# Patient Record
Sex: MALE | Race: BLACK OR AFRICAN AMERICAN | NOT HISPANIC OR LATINO
[De-identification: names, ages, dates, MRNs, and addresses within clinical notes are randomized per-mention and may not be internally consistent; named-entity substitution may affect disease eponyms.]

---

## 2017-03-24 ENCOUNTER — APPOINTMENT (OUTPATIENT)
Dept: PULMONOLOGY | Facility: CLINIC | Age: 67
End: 2017-03-24

## 2017-05-04 ENCOUNTER — APPOINTMENT (OUTPATIENT)
Dept: PULMONOLOGY | Facility: CLINIC | Age: 67
End: 2017-05-04

## 2017-05-04 VITALS
RESPIRATION RATE: 17 BRPM | DIASTOLIC BLOOD PRESSURE: 70 MMHG | OXYGEN SATURATION: 98 % | BODY MASS INDEX: 25.11 KG/M2 | HEIGHT: 67 IN | WEIGHT: 160 LBS | SYSTOLIC BLOOD PRESSURE: 120 MMHG | HEART RATE: 68 BPM | TEMPERATURE: 97.7 F

## 2017-05-04 DIAGNOSIS — R06.2 WHEEZING: ICD-10-CM

## 2017-05-04 DIAGNOSIS — G47.33 OBSTRUCTIVE SLEEP APNEA (ADULT) (PEDIATRIC): ICD-10-CM

## 2017-05-12 PROBLEM — G47.33 OBSTRUCTIVE SLEEP APNEA, ADULT: Status: ACTIVE | Noted: 2017-05-12

## 2017-06-23 ENCOUNTER — APPOINTMENT (OUTPATIENT)
Dept: SLEEP CENTER | Facility: CLINIC | Age: 67
End: 2017-06-23

## 2018-03-30 ENCOUNTER — EMERGENCY (EMERGENCY)
Facility: HOSPITAL | Age: 68
LOS: 1 days | Discharge: HOME | End: 2018-03-30
Attending: EMERGENCY MEDICINE
Payer: COMMERCIAL

## 2018-03-30 VITALS
TEMPERATURE: 99 F | DIASTOLIC BLOOD PRESSURE: 113 MMHG | RESPIRATION RATE: 24 BRPM | HEART RATE: 86 BPM | SYSTOLIC BLOOD PRESSURE: 181 MMHG | HEIGHT: 66 IN | OXYGEN SATURATION: 97 % | WEIGHT: 160.06 LBS

## 2018-03-30 VITALS
HEART RATE: 68 BPM | TEMPERATURE: 99 F | RESPIRATION RATE: 20 BRPM | DIASTOLIC BLOOD PRESSURE: 74 MMHG | SYSTOLIC BLOOD PRESSURE: 144 MMHG | OXYGEN SATURATION: 98 %

## 2018-03-30 LAB
ALBUMIN SERPL ELPH-MCNC: 4.3 G/DL — SIGNIFICANT CHANGE UP (ref 3.3–5)
ALP SERPL-CCNC: 57 U/L — SIGNIFICANT CHANGE UP (ref 40–120)
ALT FLD-CCNC: 20 U/L RC — SIGNIFICANT CHANGE UP (ref 10–45)
ANION GAP SERPL CALC-SCNC: 13 MMOL/L — SIGNIFICANT CHANGE UP (ref 5–17)
AST SERPL-CCNC: 22 U/L — SIGNIFICANT CHANGE UP (ref 10–40)
BASOPHILS # BLD AUTO: 0 K/UL — SIGNIFICANT CHANGE UP (ref 0–0.2)
BASOPHILS NFR BLD AUTO: 0.6 % — SIGNIFICANT CHANGE UP (ref 0–2)
BILIRUB SERPL-MCNC: 0.5 MG/DL — SIGNIFICANT CHANGE UP (ref 0.2–1.2)
BUN SERPL-MCNC: 10 MG/DL — SIGNIFICANT CHANGE UP (ref 7–23)
CALCIUM SERPL-MCNC: 9.4 MG/DL — SIGNIFICANT CHANGE UP (ref 8.4–10.5)
CHLORIDE SERPL-SCNC: 103 MMOL/L — SIGNIFICANT CHANGE UP (ref 96–108)
CO2 SERPL-SCNC: 22 MMOL/L — SIGNIFICANT CHANGE UP (ref 22–31)
CREAT SERPL-MCNC: 0.84 MG/DL — SIGNIFICANT CHANGE UP (ref 0.5–1.3)
EOSINOPHIL # BLD AUTO: 0.3 K/UL — SIGNIFICANT CHANGE UP (ref 0–0.5)
EOSINOPHIL NFR BLD AUTO: 4.8 % — SIGNIFICANT CHANGE UP (ref 0–6)
GLUCOSE SERPL-MCNC: 108 MG/DL — HIGH (ref 70–99)
HCT VFR BLD CALC: 45.4 % — SIGNIFICANT CHANGE UP (ref 39–50)
HGB BLD-MCNC: 15.5 G/DL — SIGNIFICANT CHANGE UP (ref 13–17)
LYMPHOCYTES # BLD AUTO: 1.1 K/UL — SIGNIFICANT CHANGE UP (ref 1–3.3)
LYMPHOCYTES # BLD AUTO: 15.4 % — SIGNIFICANT CHANGE UP (ref 13–44)
MCHC RBC-ENTMCNC: 29.9 PG — SIGNIFICANT CHANGE UP (ref 27–34)
MCHC RBC-ENTMCNC: 34.1 GM/DL — SIGNIFICANT CHANGE UP (ref 32–36)
MCV RBC AUTO: 87.5 FL — SIGNIFICANT CHANGE UP (ref 80–100)
MONOCYTES # BLD AUTO: 0.7 K/UL — SIGNIFICANT CHANGE UP (ref 0–0.9)
MONOCYTES NFR BLD AUTO: 10 % — SIGNIFICANT CHANGE UP (ref 2–14)
NEUTROPHILS # BLD AUTO: 4.9 K/UL — SIGNIFICANT CHANGE UP (ref 1.8–7.4)
NEUTROPHILS NFR BLD AUTO: 69.2 % — SIGNIFICANT CHANGE UP (ref 43–77)
PLATELET # BLD AUTO: 232 K/UL — SIGNIFICANT CHANGE UP (ref 150–400)
POTASSIUM SERPL-MCNC: 4 MMOL/L — SIGNIFICANT CHANGE UP (ref 3.5–5.3)
POTASSIUM SERPL-SCNC: 4 MMOL/L — SIGNIFICANT CHANGE UP (ref 3.5–5.3)
PROT SERPL-MCNC: 7.7 G/DL — SIGNIFICANT CHANGE UP (ref 6–8.3)
RBC # BLD: 5.19 M/UL — SIGNIFICANT CHANGE UP (ref 4.2–5.8)
RBC # FLD: 12.2 % — SIGNIFICANT CHANGE UP (ref 10.3–14.5)
SODIUM SERPL-SCNC: 138 MMOL/L — SIGNIFICANT CHANGE UP (ref 135–145)
WBC # BLD: 7 K/UL — SIGNIFICANT CHANGE UP (ref 3.8–10.5)
WBC # FLD AUTO: 7 K/UL — SIGNIFICANT CHANGE UP (ref 3.8–10.5)

## 2018-03-30 PROCEDURE — 93005 ELECTROCARDIOGRAM TRACING: CPT

## 2018-03-30 PROCEDURE — 71045 X-RAY EXAM CHEST 1 VIEW: CPT

## 2018-03-30 PROCEDURE — 96374 THER/PROPH/DIAG INJ IV PUSH: CPT

## 2018-03-30 PROCEDURE — 94640 AIRWAY INHALATION TREATMENT: CPT

## 2018-03-30 PROCEDURE — 85027 COMPLETE CBC AUTOMATED: CPT

## 2018-03-30 PROCEDURE — 99285 EMERGENCY DEPT VISIT HI MDM: CPT | Mod: 25

## 2018-03-30 PROCEDURE — 71045 X-RAY EXAM CHEST 1 VIEW: CPT | Mod: 26

## 2018-03-30 PROCEDURE — 99284 EMERGENCY DEPT VISIT MOD MDM: CPT | Mod: 25

## 2018-03-30 PROCEDURE — 80053 COMPREHEN METABOLIC PANEL: CPT

## 2018-03-30 PROCEDURE — 96375 TX/PRO/DX INJ NEW DRUG ADDON: CPT

## 2018-03-30 RX ORDER — ALBUTEROL 90 UG/1
1 AEROSOL, METERED ORAL ONCE
Qty: 0 | Refills: 0 | Status: DISCONTINUED | OUTPATIENT
Start: 2018-03-30 | End: 2018-04-03

## 2018-03-30 RX ORDER — SODIUM CHLORIDE 9 MG/ML
1000 INJECTION, SOLUTION INTRAVENOUS ONCE
Qty: 0 | Refills: 0 | Status: COMPLETED | OUTPATIENT
Start: 2018-03-30 | End: 2018-03-30

## 2018-03-30 RX ORDER — IPRATROPIUM/ALBUTEROL SULFATE 18-103MCG
3 AEROSOL WITH ADAPTER (GRAM) INHALATION ONCE
Qty: 0 | Refills: 0 | Status: COMPLETED | OUTPATIENT
Start: 2018-03-30 | End: 2018-03-30

## 2018-03-30 RX ORDER — ALBUTEROL 90 UG/1
2 AEROSOL, METERED ORAL
Qty: 1 | Refills: 0 | OUTPATIENT
Start: 2018-03-30 | End: 2018-04-13

## 2018-03-30 RX ORDER — IPRATROPIUM/ALBUTEROL SULFATE 18-103MCG
3 AEROSOL WITH ADAPTER (GRAM) INHALATION
Qty: 0 | Refills: 0 | Status: COMPLETED | OUTPATIENT
Start: 2018-03-30 | End: 2018-03-30

## 2018-03-30 RX ORDER — ACETAMINOPHEN 500 MG
2 TABLET ORAL
Qty: 18 | Refills: 0 | OUTPATIENT
Start: 2018-03-30 | End: 2018-04-01

## 2018-03-30 RX ADMIN — Medication 3 MILLILITER(S): at 08:18

## 2018-03-30 RX ADMIN — Medication 3 MILLILITER(S): at 06:49

## 2018-03-30 RX ADMIN — Medication 3 MILLILITER(S): at 06:50

## 2018-03-30 RX ADMIN — Medication 125 MILLIGRAM(S): at 06:48

## 2018-03-30 RX ADMIN — Medication 3 MILLILITER(S): at 07:16

## 2018-03-30 RX ADMIN — SODIUM CHLORIDE 2000 MILLILITER(S): 9 INJECTION, SOLUTION INTRAVENOUS at 08:18

## 2018-03-30 NOTE — ED PROVIDER NOTE - PROGRESS NOTE DETAILS
Davian Mills MD (resident): patient assessed after signout, receiving first nebulizer; diffuse expiratory wheezing bilaterally Davian Mills MD (resident): patient reassessed and feels better, appears improved, no respiratory distress. good air entry. wheezing has improved. pt given strict return precautions.

## 2018-03-30 NOTE — ED PROVIDER NOTE - CARE PLAN
Principal Discharge DX:	Acute bronchitis Principal Discharge DX:	Acute bronchitis  Assessment and plan of treatment:	1) Please follow-up with your Primary Medical Doctor in 3-5 days. If you need to find a new physician, please call [phone].  2) Return to the Emergency Department if you experiences: difficulty breathing, chest pain, wheezing, dizziness, difficulty walking, or symptoms that are new or recurrent.  3) If you have any questions or concerns, do not hesitate to contact us at [phone].  4) You were treated in the Emergency Department for wheezing and probable Bronchitis. Please take the Prednisone steroid as prescribed, once a day, for the next 4 days. Use the Albuterol inhaler every 6 hours, except when sleeping comfortably, for 2 days. Afterwards, use the Albuterol as a rescue inhaler, only as needed, for shortness of breath, coughing, or wheezing. If you are using your inhaler more frequently than every 4 hours, then you should return to the ED immediately. Otherwise, you should follow-up with your Primary Doctor or a Pulmonologist. Also take the Antibiotics.

## 2018-03-30 NOTE — ED ADULT NURSE NOTE - OBJECTIVE STATEMENT
68 yo M arrived ambulatory from triage C/o of SOB, fever. PMH Overactive bladder. Reports being current   smoker, ( marijuana) Reports subjective fever at home. Symptoms similar to past pneumonia diagnosis.  Denies  CP dizziness weakness. VS stable. o2 100 RA. IV line place labs drawn.

## 2018-03-30 NOTE — ED PROVIDER NOTE - OBJECTIVE STATEMENT
66 yo M pmhx of overactive bladder, bph, daily marijuana use presenting with several days of productive cough, sob that is progressively worsening, chills and tactile fever. Did not take meds at home. Prior epsides in past have been pna per pt.     +fever/chills, no change in vision, no throat pain, no chest pain, +sob, +cough, no abdominal pain, no nausea/vomiting,  no dysuria, urinary frequency, chronic unchanged, no new joint pain, no rashes, no focal numbness or weakness, no known mental health issues, no latex allergy

## 2018-03-30 NOTE — ED ADULT NURSE REASSESSMENT NOTE - NS ED NURSE REASSESS COMMENT FT1
patient is resting in the room waiting for dispo. patient states the nebulizer treatments have helped his sob. patient denies any chest pain, body aches. skin is warm and dry. vss/nad. will continue to monitor.
patient was educated on the use of a spacer with an inhaler. patient was able to demonstrate proper use of the spacer and inhaler. d/c instructions provided. patient had no further questions. cleared for d/c by md. states feeling much better after receiving the nebulizer treatments. denies any sob.

## 2018-03-30 NOTE — ED PROVIDER NOTE - PHYSICAL EXAMINATION
AAOX3, NAD, NCAT, EOMI, PERRL, MMM, Neck Supple, RRR, diffuse rhonchi, expiratory wheeze, bl. poor air movment. no accessory muscle use, speaks in full sentences. ABD S/NT/ND +BS, No CVAT, EXT warm, well perfused, No Edema, Distal Pulses Intact, No Rash,  MAEx4, Sensation to soft touch grossly intact, normal strength/tone.

## 2018-06-11 NOTE — ED ADULT NURSE NOTE - NEURO WDL
1:1  needed 4 point restraints today Alert and oriented to person, place and time, memory intact, behavior appropriate to situation, PERRL. 1:1

## 2018-12-03 ENCOUNTER — EMERGENCY (EMERGENCY)
Facility: HOSPITAL | Age: 68
LOS: 1 days | Discharge: HOME | End: 2018-12-03
Attending: EMERGENCY MEDICINE
Payer: COMMERCIAL

## 2018-12-03 VITALS
SYSTOLIC BLOOD PRESSURE: 147 MMHG | DIASTOLIC BLOOD PRESSURE: 98 MMHG | OXYGEN SATURATION: 98 % | RESPIRATION RATE: 16 BRPM | WEIGHT: 160.06 LBS | TEMPERATURE: 97 F | HEART RATE: 79 BPM

## 2018-12-03 VITALS
SYSTOLIC BLOOD PRESSURE: 135 MMHG | TEMPERATURE: 98 F | OXYGEN SATURATION: 98 % | DIASTOLIC BLOOD PRESSURE: 82 MMHG | HEART RATE: 80 BPM | RESPIRATION RATE: 17 BRPM

## 2018-12-03 LAB
ALBUMIN SERPL ELPH-MCNC: 4.7 G/DL — SIGNIFICANT CHANGE UP (ref 3.3–5)
ALP SERPL-CCNC: 67 U/L — SIGNIFICANT CHANGE UP (ref 40–120)
ALT FLD-CCNC: 18 U/L — SIGNIFICANT CHANGE UP (ref 10–45)
ANION GAP SERPL CALC-SCNC: 12 MMOL/L — SIGNIFICANT CHANGE UP (ref 5–17)
APTT BLD: 36.7 SEC — HIGH (ref 27.5–36.3)
AST SERPL-CCNC: 17 U/L — SIGNIFICANT CHANGE UP (ref 10–40)
BASOPHILS # BLD AUTO: 0 K/UL — SIGNIFICANT CHANGE UP (ref 0–0.2)
BASOPHILS NFR BLD AUTO: 0.2 % — SIGNIFICANT CHANGE UP (ref 0–2)
BILIRUB SERPL-MCNC: 0.4 MG/DL — SIGNIFICANT CHANGE UP (ref 0.2–1.2)
BUN SERPL-MCNC: 10 MG/DL — SIGNIFICANT CHANGE UP (ref 7–23)
CALCIUM SERPL-MCNC: 9.9 MG/DL — SIGNIFICANT CHANGE UP (ref 8.4–10.5)
CHLORIDE SERPL-SCNC: 103 MMOL/L — SIGNIFICANT CHANGE UP (ref 96–108)
CO2 SERPL-SCNC: 25 MMOL/L — SIGNIFICANT CHANGE UP (ref 22–31)
CREAT SERPL-MCNC: 0.86 MG/DL — SIGNIFICANT CHANGE UP (ref 0.5–1.3)
D DIMER BLD IA.RAPID-MCNC: <150 NG/ML DDU — SIGNIFICANT CHANGE UP
EOSINOPHIL # BLD AUTO: 0.1 K/UL — SIGNIFICANT CHANGE UP (ref 0–0.5)
EOSINOPHIL NFR BLD AUTO: 1.8 % — SIGNIFICANT CHANGE UP (ref 0–6)
GLUCOSE SERPL-MCNC: 101 MG/DL — HIGH (ref 70–99)
HCT VFR BLD CALC: 44.7 % — SIGNIFICANT CHANGE UP (ref 39–50)
HGB BLD-MCNC: 15.3 G/DL — SIGNIFICANT CHANGE UP (ref 13–17)
INR BLD: 1.11 RATIO — SIGNIFICANT CHANGE UP (ref 0.88–1.16)
LYMPHOCYTES # BLD AUTO: 1.2 K/UL — SIGNIFICANT CHANGE UP (ref 1–3.3)
LYMPHOCYTES # BLD AUTO: 18.8 % — SIGNIFICANT CHANGE UP (ref 13–44)
MCHC RBC-ENTMCNC: 28.8 PG — SIGNIFICANT CHANGE UP (ref 27–34)
MCHC RBC-ENTMCNC: 34.3 GM/DL — SIGNIFICANT CHANGE UP (ref 32–36)
MCV RBC AUTO: 84.2 FL — SIGNIFICANT CHANGE UP (ref 80–100)
MONOCYTES # BLD AUTO: 0.6 K/UL — SIGNIFICANT CHANGE UP (ref 0–0.9)
MONOCYTES NFR BLD AUTO: 8.6 % — SIGNIFICANT CHANGE UP (ref 2–14)
NEUTROPHILS # BLD AUTO: 4.7 K/UL — SIGNIFICANT CHANGE UP (ref 1.8–7.4)
NEUTROPHILS NFR BLD AUTO: 70.7 % — SIGNIFICANT CHANGE UP (ref 43–77)
PLATELET # BLD AUTO: 286 K/UL — SIGNIFICANT CHANGE UP (ref 150–400)
POTASSIUM SERPL-MCNC: 4.3 MMOL/L — SIGNIFICANT CHANGE UP (ref 3.5–5.3)
POTASSIUM SERPL-SCNC: 4.3 MMOL/L — SIGNIFICANT CHANGE UP (ref 3.5–5.3)
PROT SERPL-MCNC: 7.7 G/DL — SIGNIFICANT CHANGE UP (ref 6–8.3)
PROTHROM AB SERPL-ACNC: 12.7 SEC — SIGNIFICANT CHANGE UP (ref 10–12.9)
RBC # BLD: 5.32 M/UL — SIGNIFICANT CHANGE UP (ref 4.2–5.8)
RBC # FLD: 12.4 % — SIGNIFICANT CHANGE UP (ref 10.3–14.5)
SODIUM SERPL-SCNC: 140 MMOL/L — SIGNIFICANT CHANGE UP (ref 135–145)
WBC # BLD: 6.6 K/UL — SIGNIFICANT CHANGE UP (ref 3.8–10.5)
WBC # FLD AUTO: 6.6 K/UL — SIGNIFICANT CHANGE UP (ref 3.8–10.5)

## 2018-12-03 PROCEDURE — 93005 ELECTROCARDIOGRAM TRACING: CPT

## 2018-12-03 PROCEDURE — 99285 EMERGENCY DEPT VISIT HI MDM: CPT | Mod: 25

## 2018-12-03 PROCEDURE — 71046 X-RAY EXAM CHEST 2 VIEWS: CPT

## 2018-12-03 PROCEDURE — 80053 COMPREHEN METABOLIC PANEL: CPT

## 2018-12-03 PROCEDURE — 99284 EMERGENCY DEPT VISIT MOD MDM: CPT | Mod: 25

## 2018-12-03 PROCEDURE — 85379 FIBRIN DEGRADATION QUANT: CPT

## 2018-12-03 PROCEDURE — 36415 COLL VENOUS BLD VENIPUNCTURE: CPT

## 2018-12-03 PROCEDURE — 85610 PROTHROMBIN TIME: CPT

## 2018-12-03 PROCEDURE — 93010 ELECTROCARDIOGRAM REPORT: CPT

## 2018-12-03 PROCEDURE — 85027 COMPLETE CBC AUTOMATED: CPT

## 2018-12-03 PROCEDURE — 71046 X-RAY EXAM CHEST 2 VIEWS: CPT | Mod: 26

## 2018-12-03 PROCEDURE — 85730 THROMBOPLASTIN TIME PARTIAL: CPT

## 2018-12-03 RX ORDER — IBUPROFEN 200 MG
600 TABLET ORAL ONCE
Qty: 0 | Refills: 0 | Status: COMPLETED | OUTPATIENT
Start: 2018-12-03 | End: 2018-12-03

## 2018-12-03 RX ORDER — AZITHROMYCIN 500 MG/1
1 TABLET, FILM COATED ORAL
Qty: 4 | Refills: 0 | OUTPATIENT
Start: 2018-12-03 | End: 2018-12-06

## 2018-12-03 RX ORDER — AZITHROMYCIN 500 MG/1
500 TABLET, FILM COATED ORAL ONCE
Qty: 0 | Refills: 0 | Status: COMPLETED | OUTPATIENT
Start: 2018-12-03 | End: 2018-12-03

## 2018-12-03 RX ADMIN — AZITHROMYCIN 500 MILLIGRAM(S): 500 TABLET, FILM COATED ORAL at 12:47

## 2018-12-03 RX ADMIN — Medication 40 MILLIGRAM(S): at 12:43

## 2018-12-03 RX ADMIN — Medication 600 MILLIGRAM(S): at 12:43

## 2018-12-03 NOTE — ED PROVIDER NOTE - OBJECTIVE STATEMENT
68 y M h/o HTN (?asthma?), h/o reactive airway dz/bronchitis treated in past c bronchodilators pw 1 month cough c greenish sputum intermittently, no f/c, now c approx 3 wk worsening L chest/upper quadrant pain provoked by ambulation and deep inspiration.  No f/c, n/v.  No LE swelling.  +plane flight 3 mon ago.  Was on augmentin x 10 days, finished 2 wk ago, no improvement of pain, told by primary medical doctor to come to ED today as patient not feeling better.  No acute changes.  No back pain.  No other complaints.  Not currently wheezing; only real complaint is L chest pain.

## 2018-12-03 NOTE — ED ADULT NURSE NOTE - OBJECTIVE STATEMENT
68y m pt with wife arrived in ed with c/o cough with greenish production; head pain x 1 month; pt went to pmd was given antibiotics for 10 days; pt complains symptoms not resolving; pt was on plane x 3 mos; pt smokes marijuana; aox3; no resp distress; + chest pain when coughing; no abd pain; no n/v/d; no fever/chills; pt states finished course of antibiotics 2 weeks ago; pt taking Symbicort; pt afebrile in ed; iv placed; labs drawn; pt o2 99%; pt resting comfortably; wife at bedside; safety/comfort maintained

## 2018-12-03 NOTE — ED PROVIDER NOTE - CARE PLAN
Principal Discharge DX:	Bronchitis  Assessment and plan of treatment:	1.  Stay Hydrated  2. Continue taking all current home medications  3.  Start taking Azithromycin 250mgs daily and Prednisone 40mgs daily x 4 days  4.  Take Tylenol 650mgs every 4-6 hrs and/or Ibuprofen 600mgs every 6 hrs as needed for pain   5.  Please follow up with your Primary care provider in 1-2 (Please bring all of your results with you)  6.  Return to the ER for worsening Chest Pain, Shortness of breath or any other concerning symptoms.

## 2018-12-03 NOTE — ED PROVIDER NOTE - PLAN OF CARE
1.  Stay Hydrated  2. Continue taking all current home medications  3.  Start taking Azithromycin 250mgs daily and Prednisone 40mgs daily x 4 days  4.  Take Tylenol 650mgs every 4-6 hrs and/or Ibuprofen 600mgs every 6 hrs as needed for pain   5.  Please follow up with your Primary care provider in 1-2 (Please bring all of your results with you)  6.  Return to the ER for worsening Chest Pain, Shortness of breath or any other concerning symptoms.

## 2018-12-03 NOTE — ED ADULT TRIAGE NOTE - CHIEF COMPLAINT QUOTE
Pt was dx with pneumonia 2 weeks ago on antibiotics went to follow with his MD last week and did not have a repeat x-ray and was told to come to the emergency room if not feeling better

## 2018-12-03 NOTE — ED PROVIDER NOTE - PROGRESS NOTE DETAILS
Patient feeling well.  Labs and CXR unremarkable.  Will treat with azithro, prednisone and close outpatient follow up.  -Eric Valencia PA-C

## 2018-12-03 NOTE — ED PROVIDER NOTE - PHYSICAL EXAMINATION
GENERAL: AAOx4, GCS 15, NAD, WDWN; HEENT: MMM, no jugular venous distension, supple neck, PERRLA, EOMI, nonicteric sclera; PULM: CTA B, no crackles/rubs/rales; CV: RRR, S1S2, no MRG; ABD: Flat abdomen, NTND, no R/G/R, no CVAT.  MSK: WISE, +2 pulses x4;  NEURO: No obvious focal deficits; PSYCH: AAOx3, clear thought and normal sensorium.

## 2018-12-03 NOTE — ED PROVIDER NOTE - MEDICAL DECISION MAKING DETAILS
Chronic cough, sob, pleuritic cp.  Not c/w acute coronary syndrome, seems more c/w bronchitis c reactive airway.  Not currently wheezing.  CP may represent costochondritis but given travel hx and pleuritic component will obtain dimer, cxr.  EKG.  Assuming normal w/u in ED will d/c c z-pack and medrol dosepack.  --BMM

## 2019-08-02 NOTE — ED PROVIDER NOTE - PLAN OF CARE
Constitutional : Appears in moderate to severe distress secondary to pain, unable to talk in full sentences secondary to nausea  Head :NC AT , no swelling  Eyes :eomi, no swelling  Mouth :mm dry  Neck : supple, trachea in midline  Chest :Ru air entry, symm chest expansion, no distress  Heart :S1 S2 distant  Abdomen :abd soft, non-distended, LUQ tenderness with localized guarding and rebound, decreased bowel sounds  Musc/Skel :ext no swelling, no deformity, no spine tenderness, distal pulses present  Neuro  :AAO 3 no focal deficits
1) Please follow-up with your Primary Medical Doctor in 3-5 days. If you need to find a new physician, please call [phone].  2) Return to the Emergency Department if you experiences: difficulty breathing, chest pain, wheezing, dizziness, difficulty walking, or symptoms that are new or recurrent.  3) If you have any questions or concerns, do not hesitate to contact us at [phone].  4) You were treated in the Emergency Department for wheezing and probable Bronchitis. Please take the Prednisone steroid as prescribed, once a day, for the next 4 days. Use the Albuterol inhaler every 6 hours, except when sleeping comfortably, for 2 days. Afterwards, use the Albuterol as a rescue inhaler, only as needed, for shortness of breath, coughing, or wheezing. If you are using your inhaler more frequently than every 4 hours, then you should return to the ED immediately. Otherwise, you should follow-up with your Primary Doctor or a Pulmonologist. Also take the Antibiotics.

## 2019-10-28 ENCOUNTER — EMERGENCY (EMERGENCY)
Facility: HOSPITAL | Age: 69
LOS: 1 days | Discharge: HOME | End: 2019-10-28
Attending: EMERGENCY MEDICINE
Payer: COMMERCIAL

## 2019-10-28 VITALS
SYSTOLIC BLOOD PRESSURE: 148 MMHG | DIASTOLIC BLOOD PRESSURE: 79 MMHG | RESPIRATION RATE: 16 BRPM | HEART RATE: 63 BPM | OXYGEN SATURATION: 96 % | TEMPERATURE: 98 F

## 2019-10-28 VITALS
HEART RATE: 75 BPM | TEMPERATURE: 98 F | RESPIRATION RATE: 18 BRPM | HEIGHT: 68 IN | DIASTOLIC BLOOD PRESSURE: 87 MMHG | WEIGHT: 158.07 LBS | OXYGEN SATURATION: 97 % | SYSTOLIC BLOOD PRESSURE: 144 MMHG

## 2019-10-28 PROBLEM — J18.9 PNEUMONIA, UNSPECIFIED ORGANISM: Chronic | Status: ACTIVE | Noted: 2018-12-03

## 2019-10-28 PROBLEM — I10 ESSENTIAL (PRIMARY) HYPERTENSION: Chronic | Status: ACTIVE | Noted: 2018-12-03

## 2019-10-28 PROBLEM — J45.909 UNSPECIFIED ASTHMA, UNCOMPLICATED: Chronic | Status: ACTIVE | Noted: 2018-12-03

## 2019-10-28 LAB
ALBUMIN SERPL ELPH-MCNC: 4 G/DL — SIGNIFICANT CHANGE UP (ref 3.3–5)
ALP SERPL-CCNC: 53 U/L — SIGNIFICANT CHANGE UP (ref 40–120)
ALT FLD-CCNC: 24 U/L — SIGNIFICANT CHANGE UP (ref 10–45)
ANION GAP SERPL CALC-SCNC: 10 MMOL/L — SIGNIFICANT CHANGE UP (ref 5–17)
APPEARANCE UR: CLEAR — SIGNIFICANT CHANGE UP
AST SERPL-CCNC: 23 U/L — SIGNIFICANT CHANGE UP (ref 10–40)
BACTERIA # UR AUTO: NEGATIVE — SIGNIFICANT CHANGE UP
BASOPHILS # BLD AUTO: 0.03 K/UL — SIGNIFICANT CHANGE UP (ref 0–0.2)
BASOPHILS NFR BLD AUTO: 0.6 % — SIGNIFICANT CHANGE UP (ref 0–2)
BILIRUB SERPL-MCNC: 0.2 MG/DL — SIGNIFICANT CHANGE UP (ref 0.2–1.2)
BILIRUB UR-MCNC: NEGATIVE — SIGNIFICANT CHANGE UP
BUN SERPL-MCNC: 11 MG/DL — SIGNIFICANT CHANGE UP (ref 7–23)
CALCIUM SERPL-MCNC: 9.2 MG/DL — SIGNIFICANT CHANGE UP (ref 8.4–10.5)
CHLORIDE SERPL-SCNC: 105 MMOL/L — SIGNIFICANT CHANGE UP (ref 96–108)
CO2 SERPL-SCNC: 23 MMOL/L — SIGNIFICANT CHANGE UP (ref 22–31)
COLOR SPEC: SIGNIFICANT CHANGE UP
CREAT SERPL-MCNC: 0.75 MG/DL — SIGNIFICANT CHANGE UP (ref 0.5–1.3)
DIFF PNL FLD: ABNORMAL
EOSINOPHIL # BLD AUTO: 0.33 K/UL — SIGNIFICANT CHANGE UP (ref 0–0.5)
EOSINOPHIL NFR BLD AUTO: 6.6 % — HIGH (ref 0–6)
EPI CELLS # UR: 2 /HPF — SIGNIFICANT CHANGE UP
GLUCOSE SERPL-MCNC: 108 MG/DL — HIGH (ref 70–99)
GLUCOSE UR QL: NEGATIVE — SIGNIFICANT CHANGE UP
HCT VFR BLD CALC: 42.6 % — SIGNIFICANT CHANGE UP (ref 39–50)
HGB BLD-MCNC: 13.7 G/DL — SIGNIFICANT CHANGE UP (ref 13–17)
HYALINE CASTS # UR AUTO: 0 /LPF — SIGNIFICANT CHANGE UP (ref 0–2)
IMM GRANULOCYTES NFR BLD AUTO: 0.2 % — SIGNIFICANT CHANGE UP (ref 0–1.5)
KETONES UR-MCNC: NEGATIVE — SIGNIFICANT CHANGE UP
LEUKOCYTE ESTERASE UR-ACNC: ABNORMAL
LIDOCAIN IGE QN: 40 U/L — SIGNIFICANT CHANGE UP (ref 7–60)
LYMPHOCYTES # BLD AUTO: 1.29 K/UL — SIGNIFICANT CHANGE UP (ref 1–3.3)
LYMPHOCYTES # BLD AUTO: 25.7 % — SIGNIFICANT CHANGE UP (ref 13–44)
MCHC RBC-ENTMCNC: 26.9 PG — LOW (ref 27–34)
MCHC RBC-ENTMCNC: 32.2 GM/DL — SIGNIFICANT CHANGE UP (ref 32–36)
MCV RBC AUTO: 83.7 FL — SIGNIFICANT CHANGE UP (ref 80–100)
MONOCYTES # BLD AUTO: 0.58 K/UL — SIGNIFICANT CHANGE UP (ref 0–0.9)
MONOCYTES NFR BLD AUTO: 11.6 % — SIGNIFICANT CHANGE UP (ref 2–14)
NEUTROPHILS # BLD AUTO: 2.77 K/UL — SIGNIFICANT CHANGE UP (ref 1.8–7.4)
NEUTROPHILS NFR BLD AUTO: 55.3 % — SIGNIFICANT CHANGE UP (ref 43–77)
NITRITE UR-MCNC: NEGATIVE — SIGNIFICANT CHANGE UP
NRBC # BLD: 0 /100 WBCS — SIGNIFICANT CHANGE UP (ref 0–0)
PH UR: 6 — SIGNIFICANT CHANGE UP (ref 5–8)
PLATELET # BLD AUTO: 249 K/UL — SIGNIFICANT CHANGE UP (ref 150–400)
POTASSIUM SERPL-MCNC: 3.9 MMOL/L — SIGNIFICANT CHANGE UP (ref 3.5–5.3)
POTASSIUM SERPL-SCNC: 3.9 MMOL/L — SIGNIFICANT CHANGE UP (ref 3.5–5.3)
PROT SERPL-MCNC: 6.7 G/DL — SIGNIFICANT CHANGE UP (ref 6–8.3)
PROT UR-MCNC: SIGNIFICANT CHANGE UP
RBC # BLD: 5.09 M/UL — SIGNIFICANT CHANGE UP (ref 4.2–5.8)
RBC # FLD: 13.2 % — SIGNIFICANT CHANGE UP (ref 10.3–14.5)
RBC CASTS # UR COMP ASSIST: 3 /HPF — SIGNIFICANT CHANGE UP (ref 0–4)
SODIUM SERPL-SCNC: 138 MMOL/L — SIGNIFICANT CHANGE UP (ref 135–145)
SP GR SPEC: 1.02 — SIGNIFICANT CHANGE UP (ref 1.01–1.02)
UROBILINOGEN FLD QL: NEGATIVE — SIGNIFICANT CHANGE UP
WBC # BLD: 5.01 K/UL — SIGNIFICANT CHANGE UP (ref 3.8–10.5)
WBC # FLD AUTO: 5.01 K/UL — SIGNIFICANT CHANGE UP (ref 3.8–10.5)
WBC UR QL: 6 /HPF — HIGH (ref 0–5)

## 2019-10-28 PROCEDURE — 87086 URINE CULTURE/COLONY COUNT: CPT

## 2019-10-28 PROCEDURE — 96374 THER/PROPH/DIAG INJ IV PUSH: CPT | Mod: XU

## 2019-10-28 PROCEDURE — 99284 EMERGENCY DEPT VISIT MOD MDM: CPT | Mod: 25

## 2019-10-28 PROCEDURE — 83690 ASSAY OF LIPASE: CPT

## 2019-10-28 PROCEDURE — 80053 COMPREHEN METABOLIC PANEL: CPT

## 2019-10-28 PROCEDURE — 74177 CT ABD & PELVIS W/CONTRAST: CPT | Mod: 26

## 2019-10-28 PROCEDURE — 74177 CT ABD & PELVIS W/CONTRAST: CPT

## 2019-10-28 PROCEDURE — 81001 URINALYSIS AUTO W/SCOPE: CPT

## 2019-10-28 PROCEDURE — 99284 EMERGENCY DEPT VISIT MOD MDM: CPT

## 2019-10-28 PROCEDURE — 85027 COMPLETE CBC AUTOMATED: CPT

## 2019-10-28 RX ORDER — KETOROLAC TROMETHAMINE 30 MG/ML
15 SYRINGE (ML) INJECTION ONCE
Refills: 0 | Status: DISCONTINUED | OUTPATIENT
Start: 2019-10-28 | End: 2019-10-28

## 2019-10-28 RX ORDER — SODIUM CHLORIDE 9 MG/ML
1000 INJECTION INTRAMUSCULAR; INTRAVENOUS; SUBCUTANEOUS ONCE
Refills: 0 | Status: COMPLETED | OUTPATIENT
Start: 2019-10-28 | End: 2019-10-28

## 2019-10-28 RX ADMIN — SODIUM CHLORIDE 1000 MILLILITER(S): 9 INJECTION INTRAMUSCULAR; INTRAVENOUS; SUBCUTANEOUS at 12:46

## 2019-10-28 RX ADMIN — Medication 15 MILLIGRAM(S): at 12:49

## 2019-10-28 NOTE — ED PROVIDER NOTE - NS ED ROS FT
Gen: Denies fever  CV: Denies chest pain  Skin: Denies rash, erythema, color changes  Resp: Denies SOB  GI: +abdominal pain denies nausea, vomiting, diarrhea   Msk: + back pain. neg leg swelling  : Denies dysuria, hematuria. +increased frequency

## 2019-10-28 NOTE — ED PROVIDER NOTE - DATE/TIME 1
"28-Oct-2019 16:15

## 2019-10-28 NOTE — ED ADULT NURSE NOTE - OBJECTIVE STATEMENT
Ambulatory pt c/o abdominal pain x yesterday.  Reports pain is constant, cramping in nature, 9/10, laying on his side makes it a little better, has not tried any meds for it, no n/v, no fevers, no diarrhea, +constipation, had bowel movement this morning but felt he had to strain, no bloody or dark stool.  No hx of abdominal surgeries.  Normal PO intake.  Pt is conversive, no apparent distress, abd soft/nd/ttp rlq and suprapubic, skin wdi, denies cp, sob, urinary symptoms, falls, traumas.  Requested urine sample. Ambulatory pt c/o abdominal pain x yesterday.  Reports pain is constant, cramping in nature, 9/10, laying on his side makes it a little better, has not tried any meds for it, no n/v, no fevers, no diarrhea, +constipation, had bowel movement this morning but felt he had to strain, no bloody or dark stool.  No hx of abdominal surgeries.  Normal PO intake.  Pt is conversive, no apparent distress, abd soft/nd/ttp rlq and suprapubic, skin wdi, denies cp, sob, falls, traumas.  He denies any hematuria, dysuria or malodorous urine but endorses that when he does need to urinate as he gets closer to the bathroom he is feeling the urge to go and having small amounts of incontinence, requested urine sample.

## 2019-10-28 NOTE — ED PROVIDER NOTE - PATIENT PORTAL LINK FT
You can access the FollowMyHealth Patient Portal offered by [facility] by registering at the following website: [email or web address]. By joining [employer]’s FollowMyHealth portal, you will also be able to view your health information using other applications (apps) compatible with our system.

## 2019-10-28 NOTE — ED PROVIDER NOTE - OBJECTIVE STATEMENT
68yo M Hx of BPH presents with complaints of lower abdominal cramping x 1 day. Pt reports that he woke up yesterday morning and with lower abd cramping that progressively worsened throughout the day and is now radiating around to his back. Denies N/V/D. Last BM this AM, notes he had to strain. Has had similar pain in the past and was given medication for it, doesn't know what was the cause or the medication. Reports urinary frequency x1 month, no hematuria or dysuria. Denies fevers, chills. no recent travel

## 2019-10-28 NOTE — ED PROVIDER NOTE - CLINICAL SUMMARY MEDICAL DECISION MAKING FREE TEXT BOX
68yo M Hx of BPH p/w 1 day lower abdominal pain, no N/V/D. 1 month urinary frequency, no dysuria or hematuria. possible intraabdominal pathology vs UTI. Will obtain labs, UA, CT. pain management and reassess. Nieves: p/w lower abdominal pain, crampy, intermittent since yesterday, no N/V/D. 1 month urinary frequency, no dysuria or hematuria. possible intraabdominal pathology vs UTI. Will obtain labs, UA, CT. pain management and reassess.

## 2019-10-28 NOTE — ED PROVIDER NOTE - PROGRESS NOTE DETAILS
Pt resting comfortably, reports some improvement in his pain. Discussed lab and CT results. will start pt on miralax and have him follow up with his urologist for urinary frequency. Pt is agreeable with this plan.

## 2019-10-28 NOTE — ED PROVIDER NOTE - PHYSICAL EXAMINATION
Gen: WDWN, NAD  HEENT: EOMI, no nasal discharge, mucous membranes moist  CV: RRR, +S1/S2, no M/R/G  Resp: CTAB, no W/R/R  GI: Abdomen soft non-distended, ttp LUQ and lower abd. no rebound or guarding.   MSK: No open wounds, no bruising, no LE edema  Neuro: A&Ox4, following commands, moving all four extremities spontaneously  Psych: appropriate mood

## 2019-10-28 NOTE — ED PROVIDER NOTE - NSFOLLOWUPINSTRUCTIONS_ED_ALL_ED_FT
you were seen in the emergency department for abdominal pain. You had a CT scan that showed no obstruction and a normal appendix.     Please start taking miralax tonight you were seen in the emergency department for abdominal pain. You had a CT scan that showed no obstruction and a normal appendix.     Please start taking miralax tonight.     Continue taking all home medications as prescribed.     Please make an appointment with your urologist to follow up within the next week.     please return to the emergency department if you develop new or worsening symptoms including: worsening abdominal pain, N/V/D, inability to pass gas or have a bowel movement, fevers, painful urination or blood in the urine.

## 2019-10-29 LAB
CULTURE RESULTS: SIGNIFICANT CHANGE UP
SPECIMEN SOURCE: SIGNIFICANT CHANGE UP

## 2019-10-30 NOTE — ED POST DISCHARGE NOTE - DETAILS
patient under care of urology for urinary frequency, on medication, no fever/chills or dysuria. recommended no tx but to follow up with urologist - Frances Cerda PA-C

## 2021-09-08 ENCOUNTER — APPOINTMENT (OUTPATIENT)
Dept: UROLOGY | Facility: CLINIC | Age: 71
End: 2021-09-08
Payer: COMMERCIAL

## 2021-09-08 VITALS
BODY MASS INDEX: 24.25 KG/M2 | WEIGHT: 160 LBS | TEMPERATURE: 98.2 F | HEIGHT: 68 IN | RESPIRATION RATE: 16 BRPM | DIASTOLIC BLOOD PRESSURE: 71 MMHG | SYSTOLIC BLOOD PRESSURE: 127 MMHG | HEART RATE: 68 BPM

## 2021-09-08 DIAGNOSIS — R10.9 UNSPECIFIED ABDOMINAL PAIN: ICD-10-CM

## 2021-09-08 DIAGNOSIS — N40.0 BENIGN PROSTATIC HYPERPLASIA WITHOUT LOWER URINARY TRACT SYMPMS: ICD-10-CM

## 2021-09-08 PROCEDURE — 99204 OFFICE O/P NEW MOD 45 MIN: CPT

## 2021-09-08 PROCEDURE — 51798 US URINE CAPACITY MEASURE: CPT

## 2021-09-08 NOTE — REVIEW OF SYSTEMS
[Wheezing] : wheezing [Abdominal Pain] : abdominal pain [Joint Pain] : joint pain [Strong urge to urinate] : strong urge to urinate [Negative] : ENT

## 2021-09-09 PROBLEM — R10.9 FLANK PAIN: Status: ACTIVE | Noted: 2021-09-09

## 2021-09-09 NOTE — HISTORY OF PRESENT ILLNESS
[FreeTextEntry1] : Reason for Visit: left sided flank pain \par \par This is a 70 year-year-old gentleman with . Patient is referred for evaluation of his condition. _____. Patient denies any gross hematuria or dysuria or urinary incontinence. The patient denies any aggravating or relieving factors. The patient denies any interference of function. The patient is entirely asymptomatic. All other review of systems are negative. Past medical history, family history and social history were inquired and were noncontributory to current condition. Patient [denies tobacco use]. Medications and allergies were reviewed.\par He has a history of kidney stones.  He also has a history of postvoid dribbling which is been increasingly worrisome to him.  There is no recent PSA value for us to evaluate.\par On examination, the patient is a healthy-appearing gentleman in no acute distress. He is alert and oriented follows commands. He has normal mood and affect. He is normocephalic. Oral no thrush. Neck is supple. Respirations are unlabored. His abdomen is soft and nontender. Liver is nonpalpable. Bladder is nonpalpable. No CVA tenderness. Neurologically he is grossly intact. No peripheral edema. Skin without gross abnormality. He has normal male external genitalia. Normal meatus. Bilateral testes are descended intrascrotally and normal to palpation. On rectal examination, there is normal sphincter tone. The prostate is clinically benign without focal induration or nodularity.\par PVR: 0\par \par Assessment: Persistent and worsening left-sided flank pain in a patient with a history of kidney stones no recent imaging studies.  We plan to do cystoscopy for the hematuria that we found today also CT scan will be ordered and a PSA blood test.\par \par I counseled the patient. I discussed the various etiologies of his symptoms. []. Risks and alternatives were discussed. I answered the patient questions. The patient will follow-up as directed and will contact me with any questions or concerns. Thank you for the opportunity to participate in the care of this patient. I'll keep you updated on his progress.\par \par Plan: _____. Followup in _____\par

## 2021-09-15 ENCOUNTER — APPOINTMENT (OUTPATIENT)
Dept: UROLOGY | Facility: CLINIC | Age: 71
End: 2021-09-15
Payer: COMMERCIAL

## 2021-09-15 VITALS
RESPIRATION RATE: 16 BRPM | SYSTOLIC BLOOD PRESSURE: 120 MMHG | DIASTOLIC BLOOD PRESSURE: 78 MMHG | HEART RATE: 68 BPM | TEMPERATURE: 97.5 F

## 2021-09-15 PROCEDURE — 99212 OFFICE O/P EST SF 10 MIN: CPT

## 2021-09-15 NOTE — HISTORY OF PRESENT ILLNESS
[FreeTextEntry1] : This is a patient with pain in his left back and side who presents for follow-up.  He had an MRI performed on his back which shows a significant number of the herniated disks.  Through L1-L5.  This is the most likely cause of the pain that he is describing.  His pain is not consistent with kidney pain\par \par Patient also had a normal PSA normal basic metabolic panel.  Normal liver function test.\par \par Impression pain is from lumbar disc disease.\par \par Plan we will refer him to a for evaluation of surgical versus conservative treatment of his back.  Patient is also complaining of reflux recommend Nexium or Prilosec over-the-counter for this and follow-up with his primary care doctor for further evaluation and treatment of his reflux.  Patient will follow up his primary care doctor for yearly PSA.

## 2021-09-23 ENCOUNTER — APPOINTMENT (OUTPATIENT)
Dept: SPINE | Facility: CLINIC | Age: 71
End: 2021-09-23
Payer: COMMERCIAL

## 2021-09-23 ENCOUNTER — NON-APPOINTMENT (OUTPATIENT)
Age: 71
End: 2021-09-23

## 2021-09-23 VITALS
DIASTOLIC BLOOD PRESSURE: 77 MMHG | BODY MASS INDEX: 24.55 KG/M2 | HEIGHT: 68 IN | WEIGHT: 162 LBS | HEART RATE: 59 BPM | SYSTOLIC BLOOD PRESSURE: 146 MMHG | OXYGEN SATURATION: 97 %

## 2021-09-23 DIAGNOSIS — Z72.89 OTHER PROBLEMS RELATED TO LIFESTYLE: ICD-10-CM

## 2021-09-23 DIAGNOSIS — M54.9 DORSALGIA, UNSPECIFIED: ICD-10-CM

## 2021-09-23 PROCEDURE — 99203 OFFICE O/P NEW LOW 30 MIN: CPT

## 2021-09-23 RX ORDER — PANTOPRAZOLE 40 MG/1
40 TABLET, DELAYED RELEASE ORAL
Refills: 0 | Status: ACTIVE | COMMUNITY

## 2021-09-23 NOTE — PHYSICAL EXAM
[Person] : oriented to person [Place] : oriented to place [Time] : oriented to time [Short Term Intact] : short term memory intact [Remote Intact] : remote memory intact [Span Intact] : the attention span was normal [Concentration Intact] : normal concentrating ability [Fluency] : fluency intact [Comprehension] : comprehension intact [Current Events] : adequate knowledge of current events [Past History] : adequate knowledge of personal past history [Vocabulary] : adequate range of vocabulary [Cranial Nerves Optic (II)] : visual acuity intact bilaterally,  pupils equal round and reactive to light [Cranial Nerves Oculomotor (III)] : extraocular motion intact [Cranial Nerves Trigeminal (V)] : facial sensation intact symmetrically [Cranial Nerves Facial (VII)] : face symmetrical [Cranial Nerves Vestibulocochlear (VIII)] : hearing was intact bilaterally [Cranial Nerves Glossopharyngeal (IX)] : tongue and palate midline [Cranial Nerves Accessory (XI - Cranial And Spinal)] : head turning and shoulder shrug symmetric [Cranial Nerves Hypoglossal (XII)] : there was no tongue deviation with protrusion [Motor Tone] : muscle tone was normal in all four extremities [Motor Strength] : muscle strength was normal in all four extremities [No Muscle Atrophy] : normal bulk in all four extremities [Sensation Tactile Decrease] : light touch was intact [Abnormal Walk] : normal gait [Balance] : balance was intact [2+] : Ankle jerk left 2+ [No Tenderness to Palpation] : no spine tenderness on palpation [Full ROM] : full ROM [No Pain with ROM] : no pain with motion in any direction [Able to toe walk] : the patient was able to toe walk [Able to heel walk] : the patient was able to heel walk [Past-pointing] : there was no past-pointing [Tremor] : no tremor present [Plantar Reflex Right Only] : normal on the right [Plantar Reflex Left Only] : normal on the left [Brady] : Brady's sign was not demonstrated [Straight-Leg Raise Test - Left] : straight leg raise of the left leg was negative [Straight-Leg Raise Test - Right] : straight leg raise  of the right leg was negative

## 2021-09-23 NOTE — CONSULT LETTER
[Dear  ___] : Dear  [unfilled], [Consult Letter:] : I had the pleasure of evaluating your patient, [unfilled]. [Please see my note below.] : Please see my note below. [Consult Closing:] : Thank you very much for allowing me to participate in the care of this patient.  If you have any questions, please do not hesitate to contact me. [Sincerely,] : Sincerely, [FreeTextEntry3] : Eric Kerr MD\par Chief of Neurosurgery [facility]\par [facility]\par

## 2021-09-23 NOTE — REASON FOR VISIT
No [New Patient Visit] : a new patient visit [Referred By: _________] : Patient was referred by MARY [Other: _____] : [unfilled] [FreeTextEntry1] : Left sided back pain

## 2021-09-23 NOTE — HISTORY OF PRESENT ILLNESS
[> 3 months] : more  than 3 months [FreeTextEntry1] : Lefts lateral sided back pain  [de-identified] : \par Mr Garza  is a 70-year-old gentleman who presents with left flank pain for 7 months sudden onset.    The pain is reported a 10/10 at night.  He was provided pain medication that was note helpful.  He does routine exercises every day and walks four miles  a day without difficulty. No numbness, tingling or weakness and no urine incontinence. Lumbar MRI shows arthritic changes at multiple levels.    A no levels or any significant neural impingement.  A CT scan of the abdomen earlier this year did not reveal the cause of his pain.

## 2021-09-23 NOTE — ASSESSMENT
[FreeTextEntry1] : left sided lateral flank pain for 7- 8  months.  normal neurological exam.  Lumbar MRI  shows  DJD and no neurosurgery is indicated.  He was instructed to attend six to eight weeks of PT and consider acupuncture.  Follow up PRN.  I evaluated and examined this patient along with the nurse practitioner and we agreed on a plan of care.\par \par \par \par CC:\par Doe Jolly MD\par Urology

## 2021-09-27 ENCOUNTER — RESULT REVIEW (OUTPATIENT)
Age: 71
End: 2021-09-27

## 2021-11-30 NOTE — ED ADULT NURSE NOTE - NEURO ASSESSMENT
Dear Diana,     My name is KENNY Corcoran, and I recently had the pleasure of evaluating information related to your medical condition on our [employer] digital platform. Based on the information available, I have determined that your request for care was unable to be safely and effectively completed. Due to this, it is my recommendation that you seek care at a local Urgent Care or Immediate Care Center for an expedited and thorough face-to-face evaluation and consideration for diagnostic testing.     Please note that the [employer] department that you submitted your request for care is equivalent to a virtual format of Urgent Care or Immediate Care level of care. If you did not intend to seek this particular level of care and potentially had a scheduled appointment with another provider, we recommend that you directly call the office of the provider you are attempting to meet with to rectify connection issues.     Your care is very important to us. Please do not delay in acting on the recommendations for your care listed above. If you feel you may be experiencing a medical emergency, contact 911 immediately. If you have any questions regarding your visit, you may contact us at [phone].     Thank You,     KENNY Corcoran    WDL

## 2022-11-04 ENCOUNTER — EMERGENCY (EMERGENCY)
Facility: HOSPITAL | Age: 72
LOS: 1 days | Discharge: HOME | End: 2022-11-04
Attending: EMERGENCY MEDICINE
Payer: COMMERCIAL

## 2022-11-04 VITALS
SYSTOLIC BLOOD PRESSURE: 178 MMHG | HEIGHT: 68 IN | HEART RATE: 92 BPM | OXYGEN SATURATION: 98 % | TEMPERATURE: 99 F | DIASTOLIC BLOOD PRESSURE: 89 MMHG | RESPIRATION RATE: 19 BRPM | WEIGHT: 158.07 LBS

## 2022-11-04 VITALS
TEMPERATURE: 98 F | RESPIRATION RATE: 18 BRPM | SYSTOLIC BLOOD PRESSURE: 148 MMHG | DIASTOLIC BLOOD PRESSURE: 77 MMHG | OXYGEN SATURATION: 98 % | HEART RATE: 73 BPM

## 2022-11-04 LAB
ALBUMIN SERPL ELPH-MCNC: 4.4 G/DL — SIGNIFICANT CHANGE UP (ref 3.3–5)
ALP SERPL-CCNC: 64 U/L — SIGNIFICANT CHANGE UP (ref 40–120)
ALT FLD-CCNC: 21 U/L — SIGNIFICANT CHANGE UP (ref 10–45)
ANION GAP SERPL CALC-SCNC: 12 MMOL/L — SIGNIFICANT CHANGE UP (ref 5–17)
APPEARANCE UR: CLEAR — SIGNIFICANT CHANGE UP
AST SERPL-CCNC: 23 U/L — SIGNIFICANT CHANGE UP (ref 10–40)
BACTERIA # UR AUTO: NEGATIVE — SIGNIFICANT CHANGE UP
BASE EXCESS BLDV CALC-SCNC: 0.1 MMOL/L — SIGNIFICANT CHANGE UP (ref -2–3)
BASOPHILS # BLD AUTO: 0.02 K/UL — SIGNIFICANT CHANGE UP (ref 0–0.2)
BASOPHILS NFR BLD AUTO: 0.4 % — SIGNIFICANT CHANGE UP (ref 0–2)
BILIRUB SERPL-MCNC: 0.2 MG/DL — SIGNIFICANT CHANGE UP (ref 0.2–1.2)
BILIRUB UR-MCNC: NEGATIVE — SIGNIFICANT CHANGE UP
BUN SERPL-MCNC: 13 MG/DL — SIGNIFICANT CHANGE UP (ref 7–23)
CA-I SERPL-SCNC: 1.19 MMOL/L — SIGNIFICANT CHANGE UP (ref 1.15–1.33)
CALCIUM SERPL-MCNC: 9.3 MG/DL — SIGNIFICANT CHANGE UP (ref 8.4–10.5)
CHLORIDE BLDV-SCNC: 103 MMOL/L — SIGNIFICANT CHANGE UP (ref 96–108)
CHLORIDE SERPL-SCNC: 102 MMOL/L — SIGNIFICANT CHANGE UP (ref 96–108)
CK SERPL-CCNC: 130 U/L — SIGNIFICANT CHANGE UP (ref 30–200)
CO2 BLDV-SCNC: 25 MMOL/L — SIGNIFICANT CHANGE UP (ref 22–26)
CO2 SERPL-SCNC: 22 MMOL/L — SIGNIFICANT CHANGE UP (ref 22–31)
COLOR SPEC: COLORLESS — SIGNIFICANT CHANGE UP
CREAT SERPL-MCNC: 0.85 MG/DL — SIGNIFICANT CHANGE UP (ref 0.5–1.3)
DIFF PNL FLD: ABNORMAL
EGFR: 92 ML/MIN/1.73M2 — SIGNIFICANT CHANGE UP
EOSINOPHIL # BLD AUTO: 0.12 K/UL — SIGNIFICANT CHANGE UP (ref 0–0.5)
EOSINOPHIL NFR BLD AUTO: 2.2 % — SIGNIFICANT CHANGE UP (ref 0–6)
EPI CELLS # UR: 0 /HPF — SIGNIFICANT CHANGE UP
GAS PNL BLDV: 129 MMOL/L — LOW (ref 136–145)
GAS PNL BLDV: SIGNIFICANT CHANGE UP
GAS PNL BLDV: SIGNIFICANT CHANGE UP
GLUCOSE BLDV-MCNC: 95 MG/DL — SIGNIFICANT CHANGE UP (ref 70–99)
GLUCOSE SERPL-MCNC: 92 MG/DL — SIGNIFICANT CHANGE UP (ref 70–99)
GLUCOSE UR QL: NEGATIVE — SIGNIFICANT CHANGE UP
HCO3 BLDV-SCNC: 24 MMOL/L — SIGNIFICANT CHANGE UP (ref 22–29)
HCT VFR BLD CALC: 43.6 % — SIGNIFICANT CHANGE UP (ref 39–50)
HCT VFR BLDA CALC: 41 % — SIGNIFICANT CHANGE UP (ref 39–51)
HGB BLD CALC-MCNC: 13.7 G/DL — SIGNIFICANT CHANGE UP (ref 12.6–17.4)
HGB BLD-MCNC: 14.2 G/DL — SIGNIFICANT CHANGE UP (ref 13–17)
HYALINE CASTS # UR AUTO: 0 /LPF — SIGNIFICANT CHANGE UP (ref 0–2)
IMM GRANULOCYTES NFR BLD AUTO: 0.4 % — SIGNIFICANT CHANGE UP (ref 0–0.9)
KETONES UR-MCNC: SIGNIFICANT CHANGE UP
LACTATE BLDV-MCNC: 0.7 MMOL/L — SIGNIFICANT CHANGE UP (ref 0.5–2)
LEUKOCYTE ESTERASE UR-ACNC: NEGATIVE — SIGNIFICANT CHANGE UP
LYMPHOCYTES # BLD AUTO: 0.73 K/UL — LOW (ref 1–3.3)
LYMPHOCYTES # BLD AUTO: 13.6 % — SIGNIFICANT CHANGE UP (ref 13–44)
MCHC RBC-ENTMCNC: 27.9 PG — SIGNIFICANT CHANGE UP (ref 27–34)
MCHC RBC-ENTMCNC: 32.6 GM/DL — SIGNIFICANT CHANGE UP (ref 32–36)
MCV RBC AUTO: 85.7 FL — SIGNIFICANT CHANGE UP (ref 80–100)
MONOCYTES # BLD AUTO: 0.74 K/UL — SIGNIFICANT CHANGE UP (ref 0–0.9)
MONOCYTES NFR BLD AUTO: 13.8 % — SIGNIFICANT CHANGE UP (ref 2–14)
NEUTROPHILS # BLD AUTO: 3.74 K/UL — SIGNIFICANT CHANGE UP (ref 1.8–7.4)
NEUTROPHILS NFR BLD AUTO: 69.6 % — SIGNIFICANT CHANGE UP (ref 43–77)
NITRITE UR-MCNC: NEGATIVE — SIGNIFICANT CHANGE UP
NRBC # BLD: 0 /100 WBCS — SIGNIFICANT CHANGE UP (ref 0–0)
PCO2 BLDV: 35 MMHG — LOW (ref 42–55)
PH BLDV: 7.44 — HIGH (ref 7.32–7.43)
PH UR: 6.5 — SIGNIFICANT CHANGE UP (ref 5–8)
PLATELET # BLD AUTO: 218 K/UL — SIGNIFICANT CHANGE UP (ref 150–400)
PO2 BLDV: 79 MMHG — HIGH (ref 25–45)
POTASSIUM BLDV-SCNC: 3.9 MMOL/L — SIGNIFICANT CHANGE UP (ref 3.5–5.1)
POTASSIUM SERPL-MCNC: 3.9 MMOL/L — SIGNIFICANT CHANGE UP (ref 3.5–5.3)
POTASSIUM SERPL-SCNC: 3.9 MMOL/L — SIGNIFICANT CHANGE UP (ref 3.5–5.3)
PROT SERPL-MCNC: 7.2 G/DL — SIGNIFICANT CHANGE UP (ref 6–8.3)
PROT UR-MCNC: SIGNIFICANT CHANGE UP
RBC # BLD: 5.09 M/UL — SIGNIFICANT CHANGE UP (ref 4.2–5.8)
RBC # FLD: 14 % — SIGNIFICANT CHANGE UP (ref 10.3–14.5)
RBC CASTS # UR COMP ASSIST: 9 /HPF — HIGH (ref 0–4)
SAO2 % BLDV: 96.8 % — HIGH (ref 67–88)
SODIUM SERPL-SCNC: 136 MMOL/L — SIGNIFICANT CHANGE UP (ref 135–145)
SP GR SPEC: >1.05 (ref 1.01–1.02)
UROBILINOGEN FLD QL: NEGATIVE — SIGNIFICANT CHANGE UP
WBC # BLD: 5.37 K/UL — SIGNIFICANT CHANGE UP (ref 3.8–10.5)
WBC # FLD AUTO: 5.37 K/UL — SIGNIFICANT CHANGE UP (ref 3.8–10.5)
WBC UR QL: 0 /HPF — SIGNIFICANT CHANGE UP (ref 0–5)

## 2022-11-04 PROCEDURE — 85018 HEMOGLOBIN: CPT

## 2022-11-04 PROCEDURE — 76770 US EXAM ABDO BACK WALL COMP: CPT | Mod: 26

## 2022-11-04 PROCEDURE — 36415 COLL VENOUS BLD VENIPUNCTURE: CPT

## 2022-11-04 PROCEDURE — 84295 ASSAY OF SERUM SODIUM: CPT

## 2022-11-04 PROCEDURE — 71045 X-RAY EXAM CHEST 1 VIEW: CPT | Mod: 26

## 2022-11-04 PROCEDURE — 82550 ASSAY OF CK (CPK): CPT

## 2022-11-04 PROCEDURE — 71045 X-RAY EXAM CHEST 1 VIEW: CPT

## 2022-11-04 PROCEDURE — 80053 COMPREHEN METABOLIC PANEL: CPT

## 2022-11-04 PROCEDURE — 82330 ASSAY OF CALCIUM: CPT

## 2022-11-04 PROCEDURE — 83605 ASSAY OF LACTIC ACID: CPT

## 2022-11-04 PROCEDURE — 96374 THER/PROPH/DIAG INJ IV PUSH: CPT | Mod: XU

## 2022-11-04 PROCEDURE — 85025 COMPLETE CBC W/AUTO DIFF WBC: CPT

## 2022-11-04 PROCEDURE — 99284 EMERGENCY DEPT VISIT MOD MDM: CPT | Mod: 25

## 2022-11-04 PROCEDURE — 76770 US EXAM ABDO BACK WALL COMP: CPT

## 2022-11-04 PROCEDURE — 74177 CT ABD & PELVIS W/CONTRAST: CPT | Mod: 26,MA

## 2022-11-04 PROCEDURE — 96375 TX/PRO/DX INJ NEW DRUG ADDON: CPT

## 2022-11-04 PROCEDURE — 81001 URINALYSIS AUTO W/SCOPE: CPT

## 2022-11-04 PROCEDURE — 87086 URINE CULTURE/COLONY COUNT: CPT

## 2022-11-04 PROCEDURE — 85014 HEMATOCRIT: CPT

## 2022-11-04 PROCEDURE — 82947 ASSAY GLUCOSE BLOOD QUANT: CPT

## 2022-11-04 PROCEDURE — 83880 ASSAY OF NATRIURETIC PEPTIDE: CPT

## 2022-11-04 PROCEDURE — 82803 BLOOD GASES ANY COMBINATION: CPT

## 2022-11-04 PROCEDURE — 82435 ASSAY OF BLOOD CHLORIDE: CPT

## 2022-11-04 PROCEDURE — 83735 ASSAY OF MAGNESIUM: CPT

## 2022-11-04 PROCEDURE — 84132 ASSAY OF SERUM POTASSIUM: CPT

## 2022-11-04 PROCEDURE — 99285 EMERGENCY DEPT VISIT HI MDM: CPT

## 2022-11-04 PROCEDURE — 74177 CT ABD & PELVIS W/CONTRAST: CPT | Mod: MA

## 2022-11-04 RX ORDER — OXYCODONE HYDROCHLORIDE 5 MG/1
5 TABLET ORAL ONCE
Refills: 0 | Status: DISCONTINUED | OUTPATIENT
Start: 2022-11-04 | End: 2022-11-04

## 2022-11-04 RX ORDER — KETOROLAC TROMETHAMINE 30 MG/ML
15 SYRINGE (ML) INJECTION ONCE
Refills: 0 | Status: DISCONTINUED | OUTPATIENT
Start: 2022-11-04 | End: 2022-11-04

## 2022-11-04 RX ORDER — ACETAMINOPHEN 500 MG
1000 TABLET ORAL ONCE
Refills: 0 | Status: COMPLETED | OUTPATIENT
Start: 2022-11-04 | End: 2022-11-04

## 2022-11-04 RX ORDER — OXYCODONE HYDROCHLORIDE 5 MG/1
1 TABLET ORAL
Qty: 4 | Refills: 0
Start: 2022-11-04

## 2022-11-04 RX ORDER — LIDOCAINE 4 G/100G
1 CREAM TOPICAL ONCE
Refills: 0 | Status: COMPLETED | OUTPATIENT
Start: 2022-11-04 | End: 2022-11-04

## 2022-11-04 RX ORDER — SODIUM CHLORIDE 9 MG/ML
1000 INJECTION INTRAMUSCULAR; INTRAVENOUS; SUBCUTANEOUS ONCE
Refills: 0 | Status: COMPLETED | OUTPATIENT
Start: 2022-11-04 | End: 2022-11-04

## 2022-11-04 RX ADMIN — Medication 15 MILLIGRAM(S): at 12:15

## 2022-11-04 RX ADMIN — Medication 1000 MILLIGRAM(S): at 06:43

## 2022-11-04 RX ADMIN — Medication 15 MILLIGRAM(S): at 07:48

## 2022-11-04 RX ADMIN — LIDOCAINE 1 PATCH: 4 CREAM TOPICAL at 11:00

## 2022-11-04 RX ADMIN — OXYCODONE HYDROCHLORIDE 5 MILLIGRAM(S): 5 TABLET ORAL at 11:01

## 2022-11-04 RX ADMIN — SODIUM CHLORIDE 1000 MILLILITER(S): 9 INJECTION INTRAMUSCULAR; INTRAVENOUS; SUBCUTANEOUS at 07:47

## 2022-11-04 RX ADMIN — Medication 400 MILLIGRAM(S): at 06:07

## 2022-11-04 RX ADMIN — OXYCODONE HYDROCHLORIDE 5 MILLIGRAM(S): 5 TABLET ORAL at 12:15

## 2022-11-04 NOTE — ED ADULT NURSE REASSESSMENT NOTE - NS ED NURSE REASSESS COMMENT FT1
Pt complaining of hip pain 9/10. Toradol 15mg and fluid bolus administered.
Pt had no relief from Toradol, but partial from the OXY IR.

## 2022-11-04 NOTE — ED PROVIDER NOTE - PATIENT PORTAL LINK FT
You can access the FollowMyHealth Patient Portal offered by [facility] by registering at the following website: [email or web address]. By joining [employer]’s FollowMyHealth portal, you will also be able to view your health information using other applications (apps) compatible with our system.

## 2022-11-04 NOTE — ED ADULT NURSE NOTE - OBJECTIVE STATEMENT
The pt is a 72 y.o male c/o left sided hip pain, pt reports pain for over 1 month, pt visited pcp and stated everything came back okay. pt is alert & oriented x4, on room air, steady gait, bm normal, no othe concerns noted.

## 2022-11-04 NOTE — ED PROVIDER NOTE - CLINICAL SUMMARY MEDICAL DECISION MAKING FREE TEXT BOX
71 yo male presenting with left sided abdominal pain. vs unremarkable, LLQ tenderness w/out overt peritonitis. chronology of pain implies non-emergent surgical pathology, assess for e/o mass effect, inflammatory disease, deep space infection. cmp, cbc, ct a/p, ua/ucx. likely d/c with outpatient work up for chronic abdominal pain. 73 yo male presenting with left sided abdominal pain. vs unremarkable, LLQ tenderness w/out overt peritonitis. chronology of pain implies non-emergent surgical pathology, assess for e/o mass effect, inflammatory disease, deep space infection. cmp, cbc, ct a/p, ua/ucx. likely d/c with outpatient work up for chronic abdominal pain.  Attending Najma Craig: 73 yo male presenting with left sided abdominal pain. on exam pt with ttp left upper and lower quadrant. no rash on exam to suggest shingles. no testicular pain or discomfoft to suggest torsion. pocus performed showing no evidence of hydronephrosis or AAA. ct scan ordered to further evaluate which was negative for acute pathology. pt nontoxic appearing, will give GI follow up and close return precuations. no soband strong disteal pulses making dissection less likely

## 2022-11-04 NOTE — ED PROVIDER NOTE - OBJECTIVE STATEMENT
Attending Najma Craig: 71 yo male presenting with left sided abdominal pain. pain began approxiamtely 2 months ago and located on left sided of his abdomen. has seen his doctor and prescribed robaxin which he has been taking without improvement. no n/v. no dysuria. no testicular pain. saw his pcp again who recommended he receive and ultrasound and cxr. no n/v. pain is constant. no black stools. no numbness or tingling. . no rash. no recent falls or trauma

## 2022-11-04 NOTE — ED PROVIDER NOTE - PROGRESS NOTE DETAILS
Min Fernandes MD (PGY-2): pt signed out to night team. ct a/p unremarkable. pending UA. likely d/c to follow up with outpatient doctors for further work up/management of chronic L abdominal pain. Yandel Jaramillo, DO PGY-2: Patient signed out to me, presented with left-sided abdominal pain/flank pain for the last 2 months labs and CT are nonactionable at this time.  Patient pending urine and reassessment. Yandel Jaramillo, DO PGY-2: Labs and imaging nonactionable.  Will discharge with primary care follow-up

## 2022-11-04 NOTE — ED PROVIDER NOTE - PHYSICAL EXAMINATION
Attending Najma Craig: Gen: NAD, heent: atrauamtic, eomi, perrla, mmm, op pink, uvula midline, neck; nttp, no nuchal rigidity, chest: nttp, no crepitus, cv: rrr, no murmurs, lungs: ctab, abd: soft, left sided ttp, , no peritoneal signs, no guarding, ext: wwp, no midline spinal ttp, neg homans, skin: no rash, neuro: awake and alert, following commands, speech clear, sensation and strength intact, no focal deficits

## 2022-11-04 NOTE — ED PROVIDER NOTE - NSFOLLOWUPINSTRUCTIONS_ED_ALL_ED_FT
1.  Please follow-up with your primary care doctor within the next week.    2.  For severe pain please take the oxycodone.  Please do not drive or operate machinery or drink alcohol if you take this medication.    3.  For mild to moderate pain please take Tylenol and Motrin as needed.  Please follow instructions on packaging.      Flank Pain, Adult      Flank pain is pain in your side. The flank is the area on your side between your upper belly (abdomen) and your spine. The pain may occur over a short time (acute), or it may be long-term or come back often (chronic). It may be mild or very bad. Pain in this area can be caused by many different things.      Follow these instructions at home:  Three cups showing dark yellow, yellow, and pale yellow pee.   •Drink enough fluid to keep your pee (urine) pale yellow.      •Rest as told by your doctor.      •Take over-the-counter and prescription medicines only as told by your doctor.    •Keep a journal to keep track of:  •What has caused your flank pain.      •What has made your flank pain feel better.        •Keep all follow-up visits.        Contact a doctor if:    •Medicine does not help your pain.      •You have new symptoms.      •Your pain gets worse.      •Your symptoms last longer than 2–3 days.      •You have trouble peeing.      •You are peeing more often than normal.        Get help right away if:    •You have trouble breathing.      •You are short of breath.      •Your belly hurts, or it is swollen or red.      •You feel like you may vomit (nauseous).      •You vomit.      •You feel faint, or you faint.      •You have blood in your pee.      •You have flank pain and a fever.      These symptoms may be an emergency. Get help right away. Call your local emergency services (911 in the [place]   • Do not wait to see if the symptoms will go away.       • Do not drive yourself to the hospital.         Summary    •Flank pain is pain in your side. The flank is the area of your side between your upper belly (abdomen) and your spine.      •Flank pain may occur over a short time (acute), or it may be long-term or come back often (chronic). It may be mild or very bad.      •Pain in this area can be caused by many different things.      •Contact your doctor if your symptoms get worse or last longer than 2–3 days.      This information is not intended to replace advice given to you by your health care provider. Make sure you discuss any questions you have with your health care provider.

## 2022-11-04 NOTE — ED PROVIDER NOTE - ATTENDING CONTRIBUTION TO CARE
Attending MD Najma Craig:  I personally have seen and examined this patient.  Resident note reviewed and agree on plan of care and except where noted.  See HPI, PE, and MDM for details.

## 2022-11-05 LAB
CULTURE RESULTS: NO GROWTH — SIGNIFICANT CHANGE UP
SPECIMEN SOURCE: SIGNIFICANT CHANGE UP

## 2022-11-21 ENCOUNTER — APPOINTMENT (OUTPATIENT)
Dept: UROLOGY | Facility: CLINIC | Age: 72
End: 2022-11-21

## 2022-11-21 VITALS
HEART RATE: 72 BPM | SYSTOLIC BLOOD PRESSURE: 125 MMHG | OXYGEN SATURATION: 99 % | BODY MASS INDEX: 24.25 KG/M2 | TEMPERATURE: 97.8 F | HEIGHT: 68 IN | DIASTOLIC BLOOD PRESSURE: 73 MMHG | WEIGHT: 160 LBS

## 2022-11-21 DIAGNOSIS — R10.30 LOWER ABDOMINAL PAIN, UNSPECIFIED: ICD-10-CM

## 2022-11-21 PROCEDURE — 51736 URINE FLOW MEASUREMENT: CPT

## 2022-11-21 PROCEDURE — 99214 OFFICE O/P EST MOD 30 MIN: CPT | Mod: 25

## 2022-11-21 PROCEDURE — 51798 US URINE CAPACITY MEASURE: CPT

## 2022-11-21 NOTE — HISTORY OF PRESENT ILLNESS
[FreeTextEntry1] : LUCILA STILES is a 72 year old M who presents with left sided abdominal pain for 3 mo and worse at night.\par \par Went to ER on 11/4/22:\par \par CT A/P with iv contrast: with normal kidneys and no hydronephrosis; no stones.\par NURA with no hydro or stones.\par Some suggestion of bladder wall inflammation vs. underdistention/? cystitis picture\par Urine was very concentrated. Sp gr 1.51\par Had 9 RBC\par Urine cx negative from ER on 11/4/22\par \par Is convinced his pain is from his prostate, but his b/l abd cramps are not alleviated or exacerbated by voiding etc.\par Occ constipation, but no obvious changes.\par \par IPSS 18\par urgency and weak stream but no UUI\par Of note, is that he has had 3 procedures on his prostate: one was a microwave and he is unsure what was done the last 2 times.\par \par D passed at 89 w no FH of  cancers\par \par No straining to void.\par Emptied only 90 ml w a great stream and PVR 0\par No AUR

## 2022-11-21 NOTE — ASSESSMENT
[FreeTextEntry1] : UA, Cx\par PVR 0\par Bun/Cr and PSA drawn todaytodayNeeds to speak to PCP about left sided pain\par Due for another GI visit as he states "cramps": last colonoscopy was 6 or 7 yrs\par \par We had a lengthy discussion regarding the definition of microscopic hematuria and the significance of gross hematuria. The patient understands that the kidneys filter large volumes of blood per minute and can often permit passage of a few red blood cells  through the "filter". Though many times we may not find any significant or worrisome diagnosis, a workup is warranted. This includes microscopic study of the urine, culture, occasionally cytology, cystoscopy and upper tract imaging.\par \par Explained that I am unsure as to why he has abdominal pain.\par No obvious explanation, but for his LUTS and microheme, he will need cystoscopy.\par \par If more blood cells, consider CT urogram.\par \par 49 min spent\par \par

## 2022-11-21 NOTE — REVIEW OF SYSTEMS
[Wheezing] : wheezing [Abdominal Pain] : abdominal pain [Seen by urologist before (Name)  ___] : Preciously seen by a urologist: [unfilled] [Joint Pain] : joint pain [Negative] : Heme/Lymph

## 2022-11-21 NOTE — LETTER BODY
[Dear  ___] : Dear  [unfilled], [Consult Letter:] : I had the pleasure of evaluating your patient, [unfilled]. [Please see my note below.] : Please see my note below. [Consult Closing:] : Thank you very much for allowing me to participate in the care of this patient.  If you have any questions, please do not hesitate to contact me. [FreeTextEntry1] : Please see my note> I will keep you informed. [FreeTextEntry3] : Sincerely,\par \par Arabella Boggs MD\par Clinical \par [facility]\par [facility]\par

## 2022-11-21 NOTE — PHYSICAL EXAM
[General Appearance - Well Developed] : well developed [General Appearance - Well Nourished] : well nourished [Normal Appearance] : normal appearance [Well Groomed] : well groomed [General Appearance - In No Acute Distress] : no acute distress [Edema] : no peripheral edema [Respiration, Rhythm And Depth] : normal respiratory rhythm and effort [Exaggerated Use Of Accessory Muscles For Inspiration] : no accessory muscle use [Abdomen Soft] : soft [Abdomen Tenderness] : non-tender [Costovertebral Angle Tenderness] : no ~M costovertebral angle tenderness [Urethral Meatus] : meatus normal [Penis Abnormality] : normal circumcised penis [Urinary Bladder Findings] : the bladder was normal on palpation [Scrotum] : the scrotum was normal [Testes Mass (___cm)] : there were no testicular masses [No Prostate Nodules] : no prostate nodules [Prostate Size ___ gm] : prostate size [unfilled] gm [Normal Station and Gait] : the gait and station were normal for the patient's age [] : no rash [No Focal Deficits] : no focal deficits [Oriented To Time, Place, And Person] : oriented to person, place, and time [Affect] : the affect was normal [Mood] : the mood was normal [Not Anxious] : not anxious [No Palpable Adenopathy] : no palpable adenopathy

## 2022-11-22 LAB
BUN SERPL-MCNC: 10 MG/DL
CREAT SERPL-MCNC: 0.84 MG/DL
CREAT SERPL-MCNC: 0.87 MG/DL
EGFR: 91 ML/MIN/1.73M2
EGFR: 92 ML/MIN/1.73M2

## 2022-11-23 LAB — PSA SERPL-MCNC: 0.9 NG/ML

## 2022-12-07 ENCOUNTER — APPOINTMENT (OUTPATIENT)
Dept: UROLOGY | Facility: CLINIC | Age: 72
End: 2022-12-07

## 2022-12-07 DIAGNOSIS — R39.15 URGENCY OF URINATION: ICD-10-CM

## 2023-06-26 ENCOUNTER — EMERGENCY (EMERGENCY)
Facility: HOSPITAL | Age: 73
LOS: 1 days | Discharge: HOME | End: 2023-06-26
Attending: EMERGENCY MEDICINE
Payer: COMMERCIAL

## 2023-06-26 VITALS
SYSTOLIC BLOOD PRESSURE: 149 MMHG | WEIGHT: 164.91 LBS | TEMPERATURE: 98 F | RESPIRATION RATE: 20 BRPM | DIASTOLIC BLOOD PRESSURE: 84 MMHG | HEIGHT: 68 IN | OXYGEN SATURATION: 94 % | HEART RATE: 88 BPM

## 2023-06-26 PROCEDURE — 99284 EMERGENCY DEPT VISIT MOD MDM: CPT | Mod: 25

## 2023-06-26 PROCEDURE — 99283 EMERGENCY DEPT VISIT LOW MDM: CPT

## 2023-06-26 PROCEDURE — 73610 X-RAY EXAM OF ANKLE: CPT

## 2023-06-26 PROCEDURE — 73630 X-RAY EXAM OF FOOT: CPT

## 2023-06-26 RX ORDER — ACETAMINOPHEN 500 MG
650 TABLET ORAL ONCE
Refills: 0 | Status: COMPLETED | OUTPATIENT
Start: 2023-06-26 | End: 2023-06-26

## 2023-06-26 RX ADMIN — Medication 650 MILLIGRAM(S): at 23:58

## 2023-06-26 NOTE — ED PROVIDER NOTE - RAPID ASSESSMENT
Left ankle pain after falling on the ladder.  No head injury or other injury.    ** in order to expedite care, pt seen by me, Dr MARGOTH Craig in waiting room very briefly for rapid assessment and not full evaluation. Comprehensive history and physical is not performed by me and patient was fully dressed - patient to be sent to main ED for full history / physcial and comprehensive medical evaluation - all orders placed by me to be followed by MD in main ED and all aspects of care beyond the placement of initial orders will be managed by the MD in the main ED** Left ankle pain after falling on the ladder.  No head injury or other injury.  ** in order to expedite care, pt seen by me, Dr MARGOTH Craig in waiting room very briefly for rapid assessment and not full evaluation. Comprehensive history and physical is not performed by me and patient was fully dressed - patient to be sent to main ED for full history / physcial and comprehensive medical evaluation - all orders placed by me to be followed by MD in main ED and all aspects of care beyond the placement of initial orders will be managed by the MD in the main ED**

## 2023-06-26 NOTE — ED PROVIDER NOTE - NSFOLLOWUPINSTRUCTIONS_ED_ALL_ED_FT
Follow-up with your primary care doctor in 3 to 5 days for close reassessment.  Call the emergency department if you have difficulty seeing your doctor.  The phone number can be found on the upper left-hand side of this paperwork.    Immediately return to the Emergency Department for any new or markedly worsening symptoms.    Alternate between Tylenol and Ibuprofen. Take 1 g of Tylenol, 4 hours later take 600 mg of Ibuprofen, 4 hours after this take 1 g of Tylenol. Continuing alternating like this as needed for pain. If you do not have pain, you do not need to take this medication.

## 2023-06-26 NOTE — ED PROVIDER NOTE - ATTENDING CONTRIBUTION TO CARE
72-year-old history of hypertension asthma and enlarged prostate who reports status post falling off of a ladder.  Patient denies head strike but states that he has left lateral ankle pain.  Normocephalic/atraumatic, no pupillary dilatation, trachea midline, nontachypneic, nontachycardic, mild tenderness to palpation over the left lateral ankle and malleolus, no posterior malleolus pain to palpation, no break in skin, mild swelling of the left lower ankle  Dequan Gee MD, FACEP: In this physician's medical judgement based on clinical history and physical exam the patient's signs and symptoms lead to differential diagnoses which includes but is not limited to: Ankle sprain, lateral malleolus fracture    Historical features, symptoms, and clinical exam not consistent with: Ankle dislocation, tib-fib    Imaging was ordered and reviewed by me.    Appropriate medications for the patient's presenting complaints were ordered, and effects were reassessed.    Patient's records including prior hospital visit, med and medical history were reviewed.    Escalation to admission/observation was considered.    Will follow up on labs, therapeutics, imaging, reassess and disposition as clinically indicated.  *The above represents an initial assessment/impression. Please refer to my progress notes below for potential changes in patient clinical course*  The patient was serially evaluated throughout emergency department course by the team. There was no acute deterioration up to this time in the emergency department. The patient has demonstrated clinical improvement and/or stability, feels better at this time according to emergency department team. Agree with goals/plan of emergency department care as described in this physician's electronic medical record, including diagnostics, therapeutics and consultation recommendation as clinically warranted. Will discharge home with close outpatient follow up with primary care physician/provider and specialist if necessary. The patient and/or family was educated on expectant management and return precautions concerning signs and features to return to the emergency department, in layman terms, including but not limited to: nausea, vomiting, fever, chills, the inability to eat, take medications, or drink, persistent/worsening symptoms or any concerns at all. There are no acute or immediate life threatening issues present on history, clinical exam, or any diagnostic evaluation. The patient is a safe disposition home, has capacity and insight into their condition, is ambulatory in the Emergency Department with no further questions and will follow up with their doctor(s) this week. Diagnosis, prognosis, natural history and treatment was discussed with patient and/or family. The patient and/or family were given the opportunity to ask questions and have them answered in full. The patient and/or family are with capacity and insight into the situation, treatment, risks, benefits, alternative therapies, and understand that they can ask any further questions if needed. Patient and/or family/guardian understands anticipatory guidance and was given strict return and follow up precautions. The patient and/or family/guardian has been informed of the necessity to follow up with the PMD/Clinic/follow up as provided within 2-3 days, and the patient and/or family/guardian reports understanding of above with capacity and insight. The patient and/or family/guardian were informed of any results of their tests and are were encouraged to follow up on the findings with their doctor as well as the need to inform their doctor of any results. The patient and/or family/guardian are aware of the need to follow up with repeat testing as applicable and report understanding of the above with capacity and insight. The patient and/or family/guardian was made aware of any pending test results at the time of discharge and of the need to call back for the final results as well as the need to inform their doctor of the results.

## 2023-06-26 NOTE — ED PROVIDER NOTE - NS ED ROS FT
GENERAL: no fever  EYES: no eye pain  HEENT: no neck pain  CARDIAC: no chest pain  PULMONARY: no SOB  GI: no abdominal pain  : no dysuria  SKIN: no rashes  NEURO: no headache  MSK: + L ankle pain

## 2023-06-26 NOTE — ED PROVIDER NOTE - PHYSICAL EXAMINATION
Gen: NAD, non-toxic appearing  Head: normal appearing  HEENT: normal conjunctiva  Lung: no respiratory distress, speaking in full sentences     MSK: no visible deformities,  Tenderness at the lateral and medial malleolus.  No open wounds.  DP pulses are intact and symmetric.  Neuro: alert,  Intact  sensation to light touch over the tibial, deep and superficial peroneal nerves.  Skin: No tarsha rashes

## 2023-06-26 NOTE — ED PROVIDER NOTE - DISCHARGE DATE
The patient was seated in recliner upon AQUINO arrival. The pt's son and daughter in law were present for OT session. Pt was motivated and fully engaged in skilled OT session. The patient will continue skilled OT sessions for ther ex for activity tolerance challenges, BUE strengthening, self care training and education and pt voiced understanding and agreement. 27-Jun-2023

## 2023-06-26 NOTE — ED PROVIDER NOTE - CLINICAL SUMMARY MEDICAL DECISION MAKING FREE TEXT BOX
72-year-old male, no past medical history, presenting for left ankle  pain.   Vital signs unremarkable.  No hard signs of neurovascular injury, no evidence of open fracture.  No evidence of  dislocation.  Rule out fracture.  No evidence of soft tissue infection.  X-ray.

## 2023-06-26 NOTE — ED PROVIDER NOTE - PATIENT PORTAL LINK FT
You can access the FollowMyHealth Patient Portal offered by [facility] by registering at the following website: [email or web address]. By joining [facility]’s FollowMyHealth portal, you will also be able to view your health information using other applications (apps) compatible with our system.

## 2023-06-26 NOTE — ED PROVIDER NOTE - OBJECTIVE STATEMENT
72-year-old male, no past medical history, presenting for left ankle  pain.  Onset 3 days ago after tripping and falling from ladder.  Impacted left leg.  No other trauma was sustained.  No head trauma.  No LOC or nausea or vomiting.  No allergies to medications.  No regular meds.

## 2023-06-26 NOTE — ED PROVIDER NOTE - PROGRESS NOTE DETAILS
Min Fernandes MD: Work up negative for e/o critical/emergent pathology. Patient symptoms improved while in the ED. Plan = discharge w/ appropriate follow up and outpatient tx for symptom management. Patient happy and agreeable w/ this plan. See discharge instructions for further details.

## 2023-06-27 VITALS
HEART RATE: 77 BPM | SYSTOLIC BLOOD PRESSURE: 150 MMHG | TEMPERATURE: 98 F | OXYGEN SATURATION: 97 % | DIASTOLIC BLOOD PRESSURE: 87 MMHG | RESPIRATION RATE: 17 BRPM

## 2023-06-27 PROCEDURE — 73610 X-RAY EXAM OF ANKLE: CPT | Mod: 26,LT

## 2023-06-27 PROCEDURE — 73630 X-RAY EXAM OF FOOT: CPT | Mod: 26,LT

## 2023-06-27 NOTE — ED ADULT NURSE NOTE - DRUG PRE-SCREENING (DAST -1)
69 y/o female with respiratory failure due to pneumonia now likely mets lung cancer     lung mass with mets to liver/bone/lung   EBUS done   pathology pending   pt is a candidate for palliative treatment if tissue diagnosis confirmed his metastatic cancer   f/u pathology   TM negative     respiratory failure   due to multifactorial   pneumonia and lung mets cancer   pulmonary f/u     anemia   likely due to multifactorial   s/p workup   Hb treading down today   ? due to procedure   close monitoring   RBC transfusion for <7.5   Statement Selected

## 2023-06-27 NOTE — ED ADULT NURSE NOTE - OBJECTIVE STATEMENT
71 y/o male complaining of left ankle pain. Patient states he fell off of his ladder 4-5 days ago and has had increased pain since. Denies head strike or LOC. Denies trauma to other body parts. PMHx BPH, HTN. Upon assessment patient a&ox4, MAEx4, breathing unlabored and spontaneous on RA, abdomen soft and nontender, skin warm and dry. No left lower leg swelling/edema present. Denies chest pain, sob, n/v/d, headache, lightheadedness, numbness, tingling. Vitals stable, bed locked in lowest position and safety measures maintained.

## 2023-06-27 NOTE — ED ADULT NURSE NOTE - NSFALLRISKINTERV_ED_ALL_ED

## 2024-01-17 ENCOUNTER — LABORATORY RESULT (OUTPATIENT)
Age: 74
End: 2024-01-17

## 2024-01-17 ENCOUNTER — APPOINTMENT (OUTPATIENT)
Dept: UROLOGY | Facility: CLINIC | Age: 74
End: 2024-01-17
Payer: COMMERCIAL

## 2024-01-17 VITALS
WEIGHT: 157 LBS | HEART RATE: 76 BPM | OXYGEN SATURATION: 99 % | BODY MASS INDEX: 23.79 KG/M2 | SYSTOLIC BLOOD PRESSURE: 173 MMHG | HEIGHT: 68 IN | DIASTOLIC BLOOD PRESSURE: 80 MMHG

## 2024-01-17 DIAGNOSIS — R31.29 OTHER MICROSCOPIC HEMATURIA: ICD-10-CM

## 2024-01-17 DIAGNOSIS — R39.9 UNSPECIFIED SYMPTOMS AND SIGNS INVOLVING THE GENITOURINARY SYSTEM: ICD-10-CM

## 2024-01-17 DIAGNOSIS — N40.1 BENIGN PROSTATIC HYPERPLASIA WITH LOWER URINARY TRACT SYMPMS: ICD-10-CM

## 2024-01-17 PROCEDURE — 99214 OFFICE O/P EST MOD 30 MIN: CPT

## 2024-01-17 NOTE — REASON FOR VISIT
Call placed to patient for COVID-19 Risk Monitoring. Unable to reach patient by phone. Message left regarding the purpose of the call. Requested call back. Provided call back number. [Follow-up Visit ___] : a follow-up visit  for [unfilled]

## 2024-01-17 NOTE — PHYSICAL EXAM
[Normal Appearance] : normal appearance [Well Groomed] : well groomed [General Appearance - In No Acute Distress] : no acute distress [Edema] : no peripheral edema [Respiration, Rhythm And Depth] : normal respiratory rhythm and effort [Exaggerated Use Of Accessory Muscles For Inspiration] : no accessory muscle use [Normal Station and Gait] : the gait and station were normal for the patient's age [] : no rash [No Focal Deficits] : no focal deficits [Oriented To Time, Place, And Person] : oriented to person, place, and time [Affect] : the affect was normal [Mood] : the mood was normal [No Palpable Adenopathy] : no palpable adenopathy

## 2024-01-18 LAB
ANION GAP SERPL CALC-SCNC: 11 MMOL/L
APPEARANCE: CLEAR
BILIRUBIN URINE: NEGATIVE
BLOOD URINE: ABNORMAL
BUN SERPL-MCNC: 11 MG/DL
CALCIUM SERPL-MCNC: 9.7 MG/DL
CHLORIDE SERPL-SCNC: 102 MMOL/L
CO2 SERPL-SCNC: 24 MMOL/L
COLOR: YELLOW
CREAT SERPL-MCNC: 0.8 MG/DL
EGFR: 93 ML/MIN/1.73M2
GLUCOSE QUALITATIVE U: NEGATIVE MG/DL
GLUCOSE SERPL-MCNC: 87 MG/DL
KETONES URINE: NEGATIVE MG/DL
LEUKOCYTE ESTERASE URINE: ABNORMAL
NITRITE URINE: NEGATIVE
PH URINE: 6
POTASSIUM SERPL-SCNC: 4.8 MMOL/L
PROTEIN URINE: NEGATIVE MG/DL
SODIUM SERPL-SCNC: 137 MMOL/L
SPECIFIC GRAVITY URINE: 1.02
UROBILINOGEN URINE: 0.2 MG/DL

## 2024-01-19 LAB — URINE CYTOLOGY: NORMAL

## 2024-01-19 NOTE — ASSESSMENT
[FreeTextEntry1] :  ASSESSMENT: Microhematuria    PLAN:  Urine sent today for UA, Culture and Cytology.  Blood sample sent for BMP and PSA screen.  I recommend a cystoscopy.  I have discussed at length the risks and benefits and rationale behind the procedure and The patient is reluctant to have a cystoscopy.  I suggest that he reconsider and undergo the work up, as well as stop smoking pot. Pt understand that if he does not obtain Cystoscopy, his work-uo will be incomplete. He will reconsider.  We will obtain his Renal Ultrasound record, and lab work from his PCP Agueda Adler.

## 2024-01-19 NOTE — HISTORY OF PRESENT ILLNESS
[FreeTextEntry1] : 01/17/2024: Mr. STILES is a 73-year-old male presenting today for microhematuria. He was evaluated by his PCP who found microscopic blood in urine 3-10 RBC/hpf.  He denies gross hematuria, foul smelling urine or cloudy urine.  He has no new urinary issues or changes from baseline.  He voids 5 times a day, about 2-3 hrs interval. he reports nocturia x3. He also complains of occasional urinary urgency.   He smokes occasional cannabis.   He says he obtained a renal ultrasound from outside on 12/28 ordered by his PCP Agueda July and was told it was normal. Requested records.

## 2024-01-19 NOTE — ADDENDUM
[FreeTextEntry1] : This note was partly authored by Alexis Woods working as a scribe for SUSIE Hoover. I, SUSIE Hoover, have reviewed the content of this note and confirm it is true and accurate. I personally performed the history and physical examination and made all the decisions. 01/17/2024.

## 2024-02-01 LAB — PSA SERPL-MCNC: 3.74 NG/ML

## 2024-02-12 ENCOUNTER — APPOINTMENT (OUTPATIENT)
Dept: UROLOGY | Facility: CLINIC | Age: 74
End: 2024-02-12

## 2024-03-02 ENCOUNTER — EMERGENCY (EMERGENCY)
Facility: HOSPITAL | Age: 74
LOS: 1 days | Discharge: HOME | End: 2024-03-02
Attending: EMERGENCY MEDICINE
Payer: COMMERCIAL

## 2024-03-02 VITALS
HEART RATE: 82 BPM | OXYGEN SATURATION: 99 % | SYSTOLIC BLOOD PRESSURE: 216 MMHG | RESPIRATION RATE: 15 BRPM | TEMPERATURE: 98 F | DIASTOLIC BLOOD PRESSURE: 90 MMHG | HEIGHT: 68 IN | WEIGHT: 158.07 LBS

## 2024-03-02 VITALS
TEMPERATURE: 98 F | RESPIRATION RATE: 18 BRPM | HEART RATE: 70 BPM | SYSTOLIC BLOOD PRESSURE: 148 MMHG | OXYGEN SATURATION: 100 % | DIASTOLIC BLOOD PRESSURE: 81 MMHG

## 2024-03-02 LAB
ALBUMIN SERPL ELPH-MCNC: 4.1 G/DL — SIGNIFICANT CHANGE UP (ref 3.3–5)
ALP SERPL-CCNC: 56 U/L — SIGNIFICANT CHANGE UP (ref 40–120)
ALT FLD-CCNC: 16 U/L — SIGNIFICANT CHANGE UP (ref 10–45)
ANION GAP SERPL CALC-SCNC: 9 MMOL/L — SIGNIFICANT CHANGE UP (ref 5–17)
APTT BLD: 33.8 SEC — SIGNIFICANT CHANGE UP (ref 24.5–35.6)
AST SERPL-CCNC: 20 U/L — SIGNIFICANT CHANGE UP (ref 10–40)
BASOPHILS # BLD AUTO: 0.03 K/UL — SIGNIFICANT CHANGE UP (ref 0–0.2)
BASOPHILS NFR BLD AUTO: 0.5 % — SIGNIFICANT CHANGE UP (ref 0–2)
BILIRUB SERPL-MCNC: 0.3 MG/DL — SIGNIFICANT CHANGE UP (ref 0.2–1.2)
BUN SERPL-MCNC: 13 MG/DL — SIGNIFICANT CHANGE UP (ref 7–23)
CALCIUM SERPL-MCNC: 9.2 MG/DL — SIGNIFICANT CHANGE UP (ref 8.4–10.5)
CHLORIDE SERPL-SCNC: 105 MMOL/L — SIGNIFICANT CHANGE UP (ref 96–108)
CO2 SERPL-SCNC: 26 MMOL/L — SIGNIFICANT CHANGE UP (ref 22–31)
CREAT SERPL-MCNC: 0.77 MG/DL — SIGNIFICANT CHANGE UP (ref 0.5–1.3)
EGFR: 94 ML/MIN/1.73M2 — SIGNIFICANT CHANGE UP
EOSINOPHIL # BLD AUTO: 0.3 K/UL — SIGNIFICANT CHANGE UP (ref 0–0.5)
EOSINOPHIL NFR BLD AUTO: 5.1 % — SIGNIFICANT CHANGE UP (ref 0–6)
GLUCOSE SERPL-MCNC: 109 MG/DL — HIGH (ref 70–99)
HCT VFR BLD CALC: 42.3 % — SIGNIFICANT CHANGE UP (ref 39–50)
HGB BLD-MCNC: 13.8 G/DL — SIGNIFICANT CHANGE UP (ref 13–17)
IMM GRANULOCYTES NFR BLD AUTO: 0.3 % — SIGNIFICANT CHANGE UP (ref 0–0.9)
INR BLD: 1.1 RATIO — SIGNIFICANT CHANGE UP (ref 0.85–1.18)
LYMPHOCYTES # BLD AUTO: 0.98 K/UL — LOW (ref 1–3.3)
LYMPHOCYTES # BLD AUTO: 16.6 % — SIGNIFICANT CHANGE UP (ref 13–44)
MCHC RBC-ENTMCNC: 28 PG — SIGNIFICANT CHANGE UP (ref 27–34)
MCHC RBC-ENTMCNC: 32.6 GM/DL — SIGNIFICANT CHANGE UP (ref 32–36)
MCV RBC AUTO: 86 FL — SIGNIFICANT CHANGE UP (ref 80–100)
MONOCYTES # BLD AUTO: 0.7 K/UL — SIGNIFICANT CHANGE UP (ref 0–0.9)
MONOCYTES NFR BLD AUTO: 11.9 % — SIGNIFICANT CHANGE UP (ref 2–14)
NEUTROPHILS # BLD AUTO: 3.86 K/UL — SIGNIFICANT CHANGE UP (ref 1.8–7.4)
NEUTROPHILS NFR BLD AUTO: 65.6 % — SIGNIFICANT CHANGE UP (ref 43–77)
NRBC # BLD: 0 /100 WBCS — SIGNIFICANT CHANGE UP (ref 0–0)
PLATELET # BLD AUTO: 242 K/UL — SIGNIFICANT CHANGE UP (ref 150–400)
POTASSIUM SERPL-MCNC: 4 MMOL/L — SIGNIFICANT CHANGE UP (ref 3.5–5.3)
POTASSIUM SERPL-SCNC: 4 MMOL/L — SIGNIFICANT CHANGE UP (ref 3.5–5.3)
PROT SERPL-MCNC: 6.7 G/DL — SIGNIFICANT CHANGE UP (ref 6–8.3)
PROTHROM AB SERPL-ACNC: 12.1 SEC — SIGNIFICANT CHANGE UP (ref 9.5–13)
RBC # BLD: 4.92 M/UL — SIGNIFICANT CHANGE UP (ref 4.2–5.8)
RBC # FLD: 13.7 % — SIGNIFICANT CHANGE UP (ref 10.3–14.5)
SODIUM SERPL-SCNC: 140 MMOL/L — SIGNIFICANT CHANGE UP (ref 135–145)
TROPONIN T, HIGH SENSITIVITY RESULT: <6 NG/L — SIGNIFICANT CHANGE UP (ref 0–51)
WBC # BLD: 5.89 K/UL — SIGNIFICANT CHANGE UP (ref 3.8–10.5)
WBC # FLD AUTO: 5.89 K/UL — SIGNIFICANT CHANGE UP (ref 3.8–10.5)

## 2024-03-02 PROCEDURE — 96374 THER/PROPH/DIAG INJ IV PUSH: CPT | Mod: XU

## 2024-03-02 PROCEDURE — 0042T: CPT | Mod: MC

## 2024-03-02 PROCEDURE — 70496 CT ANGIOGRAPHY HEAD: CPT | Mod: 26,MC

## 2024-03-02 PROCEDURE — 70496 CT ANGIOGRAPHY HEAD: CPT | Mod: MC

## 2024-03-02 PROCEDURE — 70450 CT HEAD/BRAIN W/O DYE: CPT | Mod: MC

## 2024-03-02 PROCEDURE — 82962 GLUCOSE BLOOD TEST: CPT

## 2024-03-02 PROCEDURE — 85610 PROTHROMBIN TIME: CPT

## 2024-03-02 PROCEDURE — 80053 COMPREHEN METABOLIC PANEL: CPT

## 2024-03-02 PROCEDURE — 85025 COMPLETE CBC W/AUTO DIFF WBC: CPT

## 2024-03-02 PROCEDURE — 84484 ASSAY OF TROPONIN QUANT: CPT

## 2024-03-02 PROCEDURE — 70498 CT ANGIOGRAPHY NECK: CPT | Mod: MC

## 2024-03-02 PROCEDURE — 99291 CRITICAL CARE FIRST HOUR: CPT

## 2024-03-02 PROCEDURE — 93005 ELECTROCARDIOGRAM TRACING: CPT

## 2024-03-02 PROCEDURE — 70498 CT ANGIOGRAPHY NECK: CPT | Mod: 26,MC

## 2024-03-02 PROCEDURE — 99291 CRITICAL CARE FIRST HOUR: CPT | Mod: 25

## 2024-03-02 PROCEDURE — 85730 THROMBOPLASTIN TIME PARTIAL: CPT

## 2024-03-02 PROCEDURE — 70450 CT HEAD/BRAIN W/O DYE: CPT | Mod: 26,59,MC

## 2024-03-02 RX ORDER — METOCLOPRAMIDE HCL 10 MG
10 TABLET ORAL ONCE
Refills: 0 | Status: COMPLETED | OUTPATIENT
Start: 2024-03-02 | End: 2024-03-02

## 2024-03-02 RX ORDER — ACETAMINOPHEN 500 MG
1000 TABLET ORAL ONCE
Refills: 0 | Status: COMPLETED | OUTPATIENT
Start: 2024-03-02 | End: 2024-03-02

## 2024-03-02 RX ORDER — SODIUM CHLORIDE 9 MG/ML
1000 INJECTION INTRAMUSCULAR; INTRAVENOUS; SUBCUTANEOUS ONCE
Refills: 0 | Status: COMPLETED | OUTPATIENT
Start: 2024-03-02 | End: 2024-03-02

## 2024-03-02 RX ADMIN — SODIUM CHLORIDE 1000 MILLILITER(S): 9 INJECTION INTRAMUSCULAR; INTRAVENOUS; SUBCUTANEOUS at 04:11

## 2024-03-02 RX ADMIN — Medication 400 MILLIGRAM(S): at 04:11

## 2024-03-02 NOTE — ED PROVIDER NOTE - ATTENDING CONTRIBUTION TO CARE
Afebrile. Awake and Alert. Lungs CTA. Heart RRR. CN II-XII grossly intact. Moves all extremities without lateralization. No active bleeding from nares.    Code stroke

## 2024-03-02 NOTE — ED ADULT NURSE NOTE - NSFALLUNIVINTERV_ED_ALL_ED
Bed/Stretcher in lowest position, wheels locked, appropriate side rails in place/Call bell, personal items and telephone in reach/Instruct patient to call for assistance before getting out of bed/chair/stretcher/Non-slip footwear applied when patient is off stretcher/[place] to call system/Physically safe environment - no spills, clutter or unnecessary equipment/Purposeful proactive rounding/Room/bathroom lighting operational, light cord in reach

## 2024-03-02 NOTE — ED ADULT NURSE NOTE - ED STAT RN HANDOFF DETAILS
Report endorsed to young stephenson RN. Safety checks completed this shift. Safety rounds completed hourly.  IV sites checked Q2+remains WDL. Medications administered as ordered with no signs/symptoms of adverse reactions. Fall & skin precautions in place. Any issues endorsed to young RN for follow up.

## 2024-03-02 NOTE — ED PROVIDER NOTE - PATIENT PORTAL LINK FT
You can access the FollowMyHealth Patient Portal offered by [facility] by registering at the following website: [email or web address]. By joining [employer]’s FollowMyHealth portal, you will also be able to view your health information using other applications (apps) compatible with our system.

## 2024-03-02 NOTE — ED PROVIDER NOTE - OBJECTIVE STATEMENT
73M presents with headache (posterior) a/w left arm numbness and blurry vision since 4PM today. No speech deficit. No CP. No SOB.

## 2024-03-02 NOTE — CONSULT NOTE ADULT - SUBJECTIVE AND OBJECTIVE BOX
Neurology - Consult Note    -  Spectra: [phone] ([facility]), [phone] ([facility])  -    HPI: LUCILA TSILES, 73y (1950) M w/ PMHx significant for BPH presents to the ED due to a nose bleed which had lasted about 20 minutes prior to ED arrival. In the ED patient was noted to be hypertensive to  with b/l blurry vision reporting a bi-frontal headache 7/10. He also noted R facial numbness and LUE weakness for which stroke code was called. LKW 4 PM 3/1/24. Patient reports he began to feel his headache and noted the facial numbness at around 4 PM 3/1.  He did not note significant LUE weakness on stroke exam. CT head was without hemorrhage, mass effect or midline shift. CTA was without flow limiting stenosis, AVM or aneurysm. CT perfusion revealed concern for an area of decreased perfusion in the R PCA territory though it was asymptomatic. At the time of evaluation, patient reported improvement in his blurry vision back to baseline, no more facial numbness and improvement in his headache. Drift was no longer present. Patient reported a minor headache and fatigue. Patient denied nausea, vomiting, dizziness, slurred speech, vhearing changes, focal weakness or focal numbness. After receiving IV tylenol, patient reported resolution of his headache.    NIHSS: 0  PreMRS: 0  Patient was not a tenecteplase candidate as he presented outside the time window.   Patient is not a thrombectomy candidate as no LVO on imaging.    Review of Systems:  All other review of systems is negative unless indicated above.    Allergies:  No Known Allergies      PMHx/PSHx/Family Hx: As above, otherwise see below   Enlarged prostate    HTN (hypertension)    Asthma    Pneumonia        Social Hx:  No current use of tobacco, alcohol, or illicit drugs      Medications:  MEDICATIONS  (STANDING):    MEDICATIONS  (PRN):      Vitals:  T(C): 36.7 (03-02-24 @ 07:38), Max: 36.8 (03-02-24 @ 01:39)  HR: 66 (03-02-24 @ 07:38) (66 - 82)  BP: 158/81 (03-02-24 @ 07:38) (153/70 - 216/90)  RR: 18 (03-02-24 @ 07:38) (15 - 18)  SpO2: 100% (03-02-24 @ 07:38) (98% - 100%)    Physical Examination:  General - NAD  Cardiovascular - Peripheral pulses palpable, no edema  Eyes - Fundoscopy not performed due to safety precautions in the setting of infection risk    Neurologic Exam:  Mental status - Awake, Alert, Oriented to person, place, and time. Speech fluent, repetition and naming intact. Follows simple and complex commands. Attention/concentration, recent and remote memory (including registration and recall), and fund of knowledge intact    Cranial nerves - PERRLA, VFF, EOMI, face sensation (V1-V3) intact b/l, facial strength intact without asymmetry b/l, hearing intact b/l, palate with symmetric elevation, trapezius 5/5 strength b/l, tongue midline on protrusion with full lateral movement    Motor - Normal bulk and tone throughout. No pronator drift.  Strength testing            R        Deltoid:  5    Biceps:  5    Triceps:  5    Wrist Extension:  5    Wrist Flexion:  5    Interossei:  5    :  5    Hip Flexion:  5    Hip Extension:  5    Knee Flexion:  5    Knee Extension:  5    Dorsiflexion:  5    Plantar Flexion:  5        L        Deltoid:  5    Biceps:  5    Triceps:  5    Wrist Extension:  5    Wrist Flexion:  5    Interossei:  5    :  5    Hip Flexion:  5    Hip Extension:  5    Knee Flexion:  5    Knee Extension:  5    Dorsiflexion:  5    Plantar Flexion:  5      Sensation - Light touch/temperature intact throughout    DTR's -               R  Biceps:  2+    Triceps:  2+    Brachioradialis:  2+    Patellar:  2+    Ankle:  2+    Toes/plantar response:  Down    L  Biceps:  2+    Triceps:  2+    Brachioradialis:  2+    Patellar:  2+    Ankle:  2+    Toes/plantar response:  Down    Coordination - Finger to Nose intact b/l. No tremors appreciated    Gait and station - Gait not tested due to patient safety concerns    Labs:                        13.8   5.89  )-----------( 242      ( 02 Mar 2024 03:40 )             42.3     03-02    140  |  105  |  13  ----------------------------<  109<H>  4.0   |  26  |  0.77    Ca    9.2      02 Mar 2024 03:40    TPro  6.7  /  Alb  4.1  /  TBili  0.3  /  DBili  x   /  AST  20  /  ALT  16  /  AlkPhos  56  03-02    CAPILLARY BLOOD GLUCOSE      POCT Blood Glucose.: 86 mg/dL (02 Mar 2024 03:37)    LIVER FUNCTIONS - ( 02 Mar 2024 03:40 )  Alb: 4.1 g/dL / Pro: 6.7 g/dL / ALK PHOS: 56 U/L / ALT: 16 U/L / AST: 20 U/L / GGT: x             PT/INR - ( 02 Mar 2024 03:40 )   PT: 12.1 sec;   INR: 1.10 ratio         PTT - ( 02 Mar 2024 03:40 )  PTT:33.8 sec  CSF:                  Radiology:    < from: CT Angio Neck Stroke Protocol w/ IV Cont (03.02.24 @ 03:41) >  CT PERFUSION:  Technical limitations: None.    Coreinfarction: 0 ml  Penumbra / tissue at risk for active ischemia: 13 ml in the right MCA/PCA   watershed territory.    CTA NECK:  The cervical vasculature is patent without significant stenosis or   dissection.    CTA HEAD:  No large vessel occlusion,significant stenosis, aneurysm or vascular   malformation.    < end of copied text >  < from: CT Brain Stroke Protocol (03.02.24 @ 03:40) >  IMPRESSION:  No large territory acute infarct, intracranial hemorrhage, or mass effect.    < end of copied text >

## 2024-03-02 NOTE — ED PROVIDER NOTE - PROGRESS NOTE DETAILS
Symptoms resolved. Case to be discussed with stroke attending this CHUCK FERNANDEZ. Daniel Gilliland DO: received signout on patient, at this time awaiting to close loop w/ neurology based on reading "TISSUE AT RISK FOR ACTIVE ISCHEMIA: 13 mL within the right MCA/PCA   watershed territory." prior to discharge Discussed extensively with neurology who discussed extensively with stroke fellow the finding on perfusion scan most likely c/w artifact, sxs markedly improved and is neurologically intact in ED on re-assessment. Neurology is comfortable with d/c from their standpoint. Pt is comfortable with d/c. Daniel Gilliland, DO: agree w/ above, pt ok for discharge, no neuro deficits on my exam

## 2024-03-02 NOTE — ED ADULT NURSE NOTE - OBJECTIVE STATEMENT
Pt presents to the ED A&ox4 complaining of nose bleed, dizziness x 4pm yesterday. Pt endorses high BP readings.  Upon arrival, pt endorses symptoms mildly relieved. Code stroke called by ED providers at 315pm due to constellation of symptoms. Pt sent to CT for scans. Denies chest pain, shortness of breath, thunderclap headaches.

## 2024-03-02 NOTE — ED PROVIDER NOTE - NSFOLLOWUPINSTRUCTIONS_ED_ALL_ED_FT
The results of any blood tests and imaging studies completed during your visit today were discussed and explained to you and a copy provided with your discharge instructions. Please follow up with your primary care doctor within 48 hours. The results of any blood tests and imaging studies completed during your visit today were discussed and explained to you and a copy provided with your discharge instructions. Please follow up with your primary care doctor within 48 hours.    Follow up with Dr. David Eng, [address], [place], [place]; ([phone]).    If you have fever, chills, nausea, vomiting, new or worsening pain, or if you have any new symptoms return to the Emergency Department.

## 2024-03-02 NOTE — ED ADULT TRIAGE NOTE - CHIEF COMPLAINT QUOTE
C/o runny nose x few days, states nose bleeding today a/w dizziness and HA   Denies changes in vision, numbness/tingling. Speech clear

## 2024-03-02 NOTE — ED PROVIDER NOTE - PHYSICAL EXAMINATION
CONSTITUTIONAL: Well appearing, well nourished, awake, alert, oriented to person, place, time/situation and in no apparent distress  ENMT: Airway patent  EYES: Clear bilaterally  CARDIAC: Normal rate, regular rhythm.  RESPIRATORY: Breath sounds clear and equal bilaterally.  ABDOMEN: Abdomen soft, non-tender, no guarding  MUSCULOSKELETAL: Spine appears normal, no deformities, equal active FROM bilaterally  Neurologic exam: A&O x3, speech clear, WILFREDO, CN II-XII intact, motor strength +5/5 in all extremities, sensation equal bilaterally, finger-to-nose normal, gait steady  SKIN: Exposed skin normal color for race, warm, dry and intact

## 2024-03-02 NOTE — ED PROVIDER NOTE - CLINICAL SUMMARY MEDICAL DECISION MAKING FREE TEXT BOX
Stroke-like symptoms with non-focal neuro exam, outside tPA window but within intervention window. Stroke code called. Ddx includes complex migraine and HTN urgency.

## 2024-03-02 NOTE — CONSULT NOTE ADULT - ASSESSMENT
IMPRESSION   Phenomenology: Holocephalic headache with b/l blurry vision, transient LUE weakness and R facial numbness  Localization: Diffuse multifocal cerebral process  Etiology: Likely hypertensive encephalopathy vs. migraine with aura vs. both    RECOMMENDATION     [] Start ASA 81 mg daily  [] First line: recommend migraine medications (to be given all together at the same time): ketorolac (Toradol) 30mg IV q8h PRN (or acetaminophen 1g IV q8h PRN), metoclopramide (Reglan) 10mg q8h PRN, diphenhydramine (Benadryl) 25mg IV q8h PRN. Please repeat at least 2-3 cycles for medications to be effective.  [] IV hydration, Mg 2g IV x1  [] Second line: recommend solumedrol 125mg IV PRN x1  [] Third line: recommend depakote 500mg IV PRN x1  [] Patient can follow up with Vascular Neurology upon discharge, Dr. David Eng, [address], [place], [place]; ([phone]).    Case discussed with stroke fellow Dr. Zachariah Treadwell under the supervision of Dr. Lavelle Grace. Note finalized upon attending attestation. IMPRESSION   Phenomenology: Holocephalic headache with b/l blurry vision, transient LUE weakness and R facial numbness  Localization: Diffuse multifocal cerebral process  Etiology: Likely hypertensive encephalopathy vs. migraine with aura vs. both    RECOMMENDATION     [] Start ASA 81 mg daily  [] BP management & eval per primary team  [] First line: recommend migraine medications (to be given all together at the same time): ketorolac (Toradol) 30mg IV q8h PRN (or acetaminophen 1g IV q8h PRN), metoclopramide (Reglan) 10mg q8h PRN, diphenhydramine (Benadryl) 25mg IV q8h PRN. Please repeat at least 2-3 cycles for medications to be effective.  [] IV hydration, Mg 2g IV x1  [] Second line: recommend solumedrol 125mg IV PRN x1  [] Third line: recommend depakote 500mg IV PRN x1  [] Patient can follow up with Vascular Neurology upon discharge, Dr. David Eng, [address], [place], [place]; ([phone]).    Case discussed with stroke fellow Dr. Zachariah Treadwell under the supervision of Dr. Lavelle Grace. Note finalized upon attending attestation.